# Patient Record
Sex: FEMALE | Race: WHITE | NOT HISPANIC OR LATINO | URBAN - METROPOLITAN AREA
[De-identification: names, ages, dates, MRNs, and addresses within clinical notes are randomized per-mention and may not be internally consistent; named-entity substitution may affect disease eponyms.]

---

## 2018-02-25 ENCOUNTER — INPATIENT (INPATIENT)
Facility: HOSPITAL | Age: 83
LOS: 4 days | Discharge: NURSING, REHAB OR LONG-TERM CARE | DRG: 481 | End: 2018-03-02
Attending: ORTHOPAEDIC SURGERY | Admitting: ORTHOPAEDIC SURGERY
Payer: MEDICARE

## 2018-02-25 VITALS
DIASTOLIC BLOOD PRESSURE: 73 MMHG | HEART RATE: 70 BPM | SYSTOLIC BLOOD PRESSURE: 110 MMHG | OXYGEN SATURATION: 99 % | RESPIRATION RATE: 20 BRPM

## 2018-02-25 DIAGNOSIS — S72.001A FRACTURE OF UNSPECIFIED PART OF NECK OF RIGHT FEMUR, INITIAL ENCOUNTER FOR CLOSED FRACTURE: ICD-10-CM

## 2018-02-25 LAB
ALBUMIN SERPL ELPH-MCNC: 4.4 G/DL — SIGNIFICANT CHANGE UP (ref 3.3–5)
ALP SERPL-CCNC: 86 U/L — SIGNIFICANT CHANGE UP (ref 40–120)
ALT FLD-CCNC: 12 U/L RC — SIGNIFICANT CHANGE UP (ref 10–45)
ANION GAP SERPL CALC-SCNC: 15 MMOL/L — SIGNIFICANT CHANGE UP (ref 5–17)
APPEARANCE UR: CLEAR — SIGNIFICANT CHANGE UP
APTT BLD: 23.1 SEC — LOW (ref 27.5–37.4)
AST SERPL-CCNC: 25 U/L — SIGNIFICANT CHANGE UP (ref 10–40)
BASOPHILS # BLD AUTO: 0.1 K/UL — SIGNIFICANT CHANGE UP (ref 0–0.2)
BASOPHILS NFR BLD AUTO: 0.3 % — SIGNIFICANT CHANGE UP (ref 0–2)
BILIRUB SERPL-MCNC: 0.3 MG/DL — SIGNIFICANT CHANGE UP (ref 0.2–1.2)
BILIRUB UR-MCNC: NEGATIVE — SIGNIFICANT CHANGE UP
BLD GP AB SCN SERPL QL: NEGATIVE — SIGNIFICANT CHANGE UP
BUN SERPL-MCNC: 14 MG/DL — SIGNIFICANT CHANGE UP (ref 7–23)
CALCIUM SERPL-MCNC: 9.6 MG/DL — SIGNIFICANT CHANGE UP (ref 8.4–10.5)
CHLORIDE SERPL-SCNC: 102 MMOL/L — SIGNIFICANT CHANGE UP (ref 96–108)
CO2 SERPL-SCNC: 26 MMOL/L — SIGNIFICANT CHANGE UP (ref 22–31)
COLOR SPEC: YELLOW — SIGNIFICANT CHANGE UP
CREAT SERPL-MCNC: 0.84 MG/DL — SIGNIFICANT CHANGE UP (ref 0.5–1.3)
DIFF PNL FLD: NEGATIVE — SIGNIFICANT CHANGE UP
EOSINOPHIL # BLD AUTO: 0 K/UL — SIGNIFICANT CHANGE UP (ref 0–0.5)
EOSINOPHIL NFR BLD AUTO: 0.1 % — SIGNIFICANT CHANGE UP (ref 0–6)
GLUCOSE SERPL-MCNC: 133 MG/DL — HIGH (ref 70–99)
GLUCOSE UR QL: NEGATIVE — SIGNIFICANT CHANGE UP
HCT VFR BLD CALC: 37.1 % — SIGNIFICANT CHANGE UP (ref 34.5–45)
HGB BLD-MCNC: 12.4 G/DL — SIGNIFICANT CHANGE UP (ref 11.5–15.5)
INR BLD: 1.06 RATIO — SIGNIFICANT CHANGE UP (ref 0.88–1.16)
KETONES UR-MCNC: NEGATIVE — SIGNIFICANT CHANGE UP
LEUKOCYTE ESTERASE UR-ACNC: NEGATIVE — SIGNIFICANT CHANGE UP
LYMPHOCYTES # BLD AUTO: 0.7 K/UL — LOW (ref 1–3.3)
LYMPHOCYTES # BLD AUTO: 4 % — LOW (ref 13–44)
MCHC RBC-ENTMCNC: 32.9 PG — SIGNIFICANT CHANGE UP (ref 27–34)
MCHC RBC-ENTMCNC: 33.5 GM/DL — SIGNIFICANT CHANGE UP (ref 32–36)
MCV RBC AUTO: 98.3 FL — SIGNIFICANT CHANGE UP (ref 80–100)
MONOCYTES # BLD AUTO: 0.9 K/UL — SIGNIFICANT CHANGE UP (ref 0–0.9)
MONOCYTES NFR BLD AUTO: 5.2 % — SIGNIFICANT CHANGE UP (ref 2–14)
NEUTROPHILS # BLD AUTO: 15.9 K/UL — HIGH (ref 1.8–7.4)
NEUTROPHILS NFR BLD AUTO: 90.5 % — HIGH (ref 43–77)
NITRITE UR-MCNC: NEGATIVE — SIGNIFICANT CHANGE UP
PH UR: 6.5 — SIGNIFICANT CHANGE UP (ref 5–8)
PLATELET # BLD AUTO: 258 K/UL — SIGNIFICANT CHANGE UP (ref 150–400)
POTASSIUM SERPL-MCNC: 4 MMOL/L — SIGNIFICANT CHANGE UP (ref 3.5–5.3)
POTASSIUM SERPL-SCNC: 4 MMOL/L — SIGNIFICANT CHANGE UP (ref 3.5–5.3)
PROT SERPL-MCNC: 8 G/DL — SIGNIFICANT CHANGE UP (ref 6–8.3)
PROT UR-MCNC: SIGNIFICANT CHANGE UP
PROTHROM AB SERPL-ACNC: 11.5 SEC — SIGNIFICANT CHANGE UP (ref 9.8–12.7)
RBC # BLD: 3.78 M/UL — LOW (ref 3.8–5.2)
RBC # FLD: 12.2 % — SIGNIFICANT CHANGE UP (ref 10.3–14.5)
RH IG SCN BLD-IMP: POSITIVE — SIGNIFICANT CHANGE UP
SODIUM SERPL-SCNC: 143 MMOL/L — SIGNIFICANT CHANGE UP (ref 135–145)
SP GR SPEC: 1.02 — SIGNIFICANT CHANGE UP (ref 1.01–1.02)
UROBILINOGEN FLD QL: NEGATIVE — SIGNIFICANT CHANGE UP
WBC # BLD: 17.5 K/UL — HIGH (ref 3.8–10.5)
WBC # FLD AUTO: 17.5 K/UL — HIGH (ref 3.8–10.5)

## 2018-02-25 PROCEDURE — 93010 ELECTROCARDIOGRAM REPORT: CPT

## 2018-02-25 PROCEDURE — 73552 X-RAY EXAM OF FEMUR 2/>: CPT | Mod: 26,RT

## 2018-02-25 PROCEDURE — 99285 EMERGENCY DEPT VISIT HI MDM: CPT

## 2018-02-25 PROCEDURE — 71045 X-RAY EXAM CHEST 1 VIEW: CPT | Mod: 26

## 2018-02-25 PROCEDURE — 73502 X-RAY EXAM HIP UNI 2-3 VIEWS: CPT | Mod: 26,77,RT

## 2018-02-25 PROCEDURE — 73502 X-RAY EXAM HIP UNI 2-3 VIEWS: CPT | Mod: 26,RT

## 2018-02-25 RX ORDER — OXYCODONE HYDROCHLORIDE 5 MG/1
5 TABLET ORAL EVERY 4 HOURS
Qty: 0 | Refills: 0 | Status: DISCONTINUED | OUTPATIENT
Start: 2018-02-25 | End: 2018-02-26

## 2018-02-25 RX ORDER — ONDANSETRON 8 MG/1
4 TABLET, FILM COATED ORAL EVERY 4 HOURS
Qty: 0 | Refills: 0 | Status: DISCONTINUED | OUTPATIENT
Start: 2018-02-25 | End: 2018-02-27

## 2018-02-25 RX ORDER — MORPHINE SULFATE 50 MG/1
2 CAPSULE, EXTENDED RELEASE ORAL ONCE
Qty: 0 | Refills: 0 | Status: DISCONTINUED | OUTPATIENT
Start: 2018-02-25 | End: 2018-02-25

## 2018-02-25 RX ORDER — HEPARIN SODIUM 5000 [USP'U]/ML
5000 INJECTION INTRAVENOUS; SUBCUTANEOUS EVERY 8 HOURS
Qty: 0 | Refills: 0 | Status: COMPLETED | OUTPATIENT
Start: 2018-02-25 | End: 2018-02-25

## 2018-02-25 RX ORDER — FERROUS SULFATE 325(65) MG
325 TABLET ORAL
Qty: 0 | Refills: 0 | Status: DISCONTINUED | OUTPATIENT
Start: 2018-02-25 | End: 2018-02-26

## 2018-02-25 RX ORDER — FOLIC ACID 0.8 MG
1 TABLET ORAL DAILY
Qty: 0 | Refills: 0 | Status: DISCONTINUED | OUTPATIENT
Start: 2018-02-25 | End: 2018-02-27

## 2018-02-25 RX ORDER — MAGNESIUM HYDROXIDE 400 MG/1
30 TABLET, CHEWABLE ORAL DAILY
Qty: 0 | Refills: 0 | Status: DISCONTINUED | OUTPATIENT
Start: 2018-02-25 | End: 2018-02-27

## 2018-02-25 RX ORDER — ACETAMINOPHEN 500 MG
650 TABLET ORAL EVERY 6 HOURS
Qty: 0 | Refills: 0 | Status: DISCONTINUED | OUTPATIENT
Start: 2018-02-25 | End: 2018-02-27

## 2018-02-25 RX ORDER — SODIUM CHLORIDE 9 MG/ML
1000 INJECTION, SOLUTION INTRAVENOUS
Qty: 0 | Refills: 0 | Status: DISCONTINUED | OUTPATIENT
Start: 2018-02-25 | End: 2018-02-27

## 2018-02-25 RX ORDER — SIMVASTATIN 20 MG/1
1 TABLET, FILM COATED ORAL
Qty: 0 | Refills: 0 | COMMUNITY

## 2018-02-25 RX ORDER — HYDROMORPHONE HYDROCHLORIDE 2 MG/ML
0.25 INJECTION INTRAMUSCULAR; INTRAVENOUS; SUBCUTANEOUS EVERY 6 HOURS
Qty: 0 | Refills: 0 | Status: DISCONTINUED | OUTPATIENT
Start: 2018-02-25 | End: 2018-02-26

## 2018-02-25 RX ORDER — OXYCODONE HYDROCHLORIDE 5 MG/1
10 TABLET ORAL EVERY 4 HOURS
Qty: 0 | Refills: 0 | Status: DISCONTINUED | OUTPATIENT
Start: 2018-02-25 | End: 2018-02-26

## 2018-02-25 RX ORDER — DOCUSATE SODIUM 100 MG
100 CAPSULE ORAL THREE TIMES A DAY
Qty: 0 | Refills: 0 | Status: DISCONTINUED | OUTPATIENT
Start: 2018-02-25 | End: 2018-02-27

## 2018-02-25 RX ORDER — DIPHENHYDRAMINE HCL 50 MG
25 CAPSULE ORAL AT BEDTIME
Qty: 0 | Refills: 0 | Status: DISCONTINUED | OUTPATIENT
Start: 2018-02-25 | End: 2018-02-26

## 2018-02-25 RX ORDER — ASCORBIC ACID 60 MG
500 TABLET,CHEWABLE ORAL
Qty: 0 | Refills: 0 | Status: DISCONTINUED | OUTPATIENT
Start: 2018-02-25 | End: 2018-02-27

## 2018-02-25 RX ADMIN — HEPARIN SODIUM 5000 UNIT(S): 5000 INJECTION INTRAVENOUS; SUBCUTANEOUS at 23:05

## 2018-02-25 RX ADMIN — MORPHINE SULFATE 2 MILLIGRAM(S): 50 CAPSULE, EXTENDED RELEASE ORAL at 14:49

## 2018-02-25 RX ADMIN — MORPHINE SULFATE 2 MILLIGRAM(S): 50 CAPSULE, EXTENDED RELEASE ORAL at 12:39

## 2018-02-25 RX ADMIN — MORPHINE SULFATE 2 MILLIGRAM(S): 50 CAPSULE, EXTENDED RELEASE ORAL at 14:30

## 2018-02-25 RX ADMIN — Medication 650 MILLIGRAM(S): at 23:58

## 2018-02-25 NOTE — H&P ADULT - NSHPLABSRESULTS_GEN_ALL_CORE
xray pelvis/hip: R IT fracture      INTERPRETATION: CLINICAL INFORMATION: Fall one day prior, right hip pain.     EXAM: Frontal and crosstable lateral views of the right hip and femur from   2/25/2018 at 1324 hours     COMPARISON: No similar prior studies available for comparison.     FINDINGS:   Acute comminuted intertrochanteric fracture of the right femur with medial   angulation and mild proximal retraction of the distal fracture fragment and   overlying soft tissue swelling.   The femoral head maintains normal alignment with the acetabulum. The bones   are osteopenic. There are degenerative changes of the knee.       IMPRESSION:   Acute comminuted intertrochanteric fracture of the right femur with medial   angulation and mild proximal retraction of the distal fracture fragment and   overlying soft tissue swelling

## 2018-02-25 NOTE — CONSULT NOTE ADULT - SUBJECTIVE AND OBJECTIVE BOX
CHIEF COMPLAINT: s/p fall, right hip fracture    HPI:  86y Female ambulator presents c/o R hip pain and inability to ambulate s/p mechanical fall. Pt was found down this AM at home by . Patients baseline has dementia and is AOx2.  Denies any loss of consciousness. Denies numbness/tingling. Denies fever/chills. Denies pain/injury elsewhere.                    PAST MEDICAL & SURGICAL HISTORY:  Dementia  Dyslipidemia  S/P breast biopsy: Right breast cyst benign.    MEDICATIONS  (STANDING):  ascorbic acid 500 milliGRAM(s) Oral two times a day  docusate sodium 100 milliGRAM(s) Oral three times a day  ferrous    sulfate 325 milliGRAM(s) Oral three times a day with meals  folic acid 1 milliGRAM(s) Oral daily  heparin  Injectable 5000 Unit(s) SubCutaneous every 8 hours  lactated ringers. 1000 milliLiter(s) (75 mL/Hr) IV Continuous <Continuous>  multivitamin 1 Tablet(s) Oral daily    MEDICATIONS  (PRN):  acetaminophen   Tablet 650 milliGRAM(s) Oral every 6 hours PRN For Temp greater than 38 C (100.4 F)  acetaminophen   Tablet. 650 milliGRAM(s) Oral every 6 hours PRN headache  diphenhydrAMINE   Capsule 25 milliGRAM(s) Oral at bedtime PRN Insomnia  HYDROmorphone  Injectable 0.25 milliGRAM(s) IV Push every 6 hours PRN Severe Pain (7 - 10)  magnesium hydroxide Suspension 30 milliLiter(s) Oral daily PRN Constipation  ondansetron Injectable 4 milliGRAM(s) IV Push every 4 hours PRN Nausea and/or Vomiting  oxyCODONE    IR 10 milliGRAM(s) Oral every 4 hours PRN Moderate Pain  oxyCODONE    IR 5 milliGRAM(s) Oral every 4 hours PRN Mild Pain      Allergies    No Known Allergies    SOCIAL HISTORY    Smoking Hx:  ETOH Hx:  Marital Status:   Occupational Hx:    FAMILY HISTORY:  No pertinent family history in first degree relatives        REVIEW OF SYSTEMS:    CONSTITUTIONAL: No weakness, fevers or chills  EYES/ENT: No visual changes;  No vertigo or throat pain   NECK: No pain or stiffness  RESPIRATORY: No cough, wheezing, hemoptysis; No shortness of breath  CARDIOVASCULAR: No chest pain or palpitations  GASTROINTESTINAL: No abdominal or epigastric pain. No nausea, vomiting, or hematemesis; No diarrhea or constipation. No melena or hematochezia.  GENITOURINARY: No dysuria, frequency or hematuria  NEUROLOGICAL: No numbness or weakness  SKIN: No itching, burning, rashes, or lesions   All other review of systems is negative unless indicated above    Vital Signs Last 24 Hrs  T(C): 36.8 (25 Feb 2018 17:19), Max: 37 (25 Feb 2018 16:56)  T(F): 98.2 (25 Feb 2018 17:19), Max: 98.6 (25 Feb 2018 16:56)  HR: 75 (25 Feb 2018 17:19) (64 - 75)  BP: 149/72 (25 Feb 2018 17:19) (110/73 - 158/83)  BP(mean): --  RR: 16 (25 Feb 2018 17:19) (16 - 20)  SpO2: 94% (25 Feb 2018 17:19) (94% - 99%)    I&O's Summary      PHYSICAL EXAM:    Constitutional: NAD, awake and alert, well-developed  HEENT: PERR, EOMI  Neck: soft and supple, No LAD, No JVD  Respiratory: Breath sounds are clear bilaterally, No wheezing, rales or rhonchi  Cardiovascular: Regular rate and rhythm, normal S1 and S2,  no murmurs, gallops or rubs  Gastrointestinal: Bowel Sounds present, soft, nontender.   Extremities: No peripheral edema. No clubbing or cyanosis.  Vascular: 2+ peripheral pulses  Neurological: A/O x 3, no focal deficits  Musculoskeletal: no calf tenderness.  Skin: No rashes.      LABS: All Labs Reviewed:                        12.4   17.5  )-----------( 258      ( 25 Feb 2018 12:24 )             37.1     25 Feb 2018 12:24    143    |  102    |  14     ----------------------------<  133    4.0     |  26     |  0.84     Ca    9.6        25 Feb 2018 12:24    TPro  8.0    /  Alb  4.4    /  TBili  0.3    /  DBili  x      /  AST  25     /  ALT  12     /  AlkPhos  86     25 Feb 2018 12:24    PT/INR - ( 25 Feb 2018 12:24 )   PT: 11.5 sec;   INR: 1.06 ratio         PTT - ( 25 Feb 2018 12:24 )  PTT:23.1 sec    RADIOLOGY/EKG:    < from: Xray Hip 2-3 Views, Right (02.25.18 @ 13:24) >  IMPRESSION:  Acute comminuted intertrochanteric fracture of the right femur with   medial angulation and mild proximal retraction of the distal fracture   fragment and overlying soft tissue swelling.      < end of copied text > CHIEF COMPLAINT: s/p fall, right hip fracture                              Patient with dementia so obtained history from chart and daughter at bedside    HPI:  86y Female ambulator presents c/o R hip pain and inability to ambulate s/p mechanical fall. Pt was found down this AM at home by . Patients baseline has dementia and is AOx2.  Denies any loss of consciousness. Denies numbness/tingling. Denies fever/chills. Denies pain/injury elsewhere. No significant PMH                   PAST MEDICAL & SURGICAL HISTORY:  Dementia  Dyslipidemia  S/P breast biopsy: Right breast cyst benign.    MEDICATIONS  (STANDING):  ascorbic acid 500 milliGRAM(s) Oral two times a day  docusate sodium 100 milliGRAM(s) Oral three times a day  ferrous    sulfate 325 milliGRAM(s) Oral three times a day with meals  folic acid 1 milliGRAM(s) Oral daily  heparin  Injectable 5000 Unit(s) SubCutaneous every 8 hours  lactated ringers. 1000 milliLiter(s) (75 mL/Hr) IV Continuous <Continuous>  multivitamin 1 Tablet(s) Oral daily    MEDICATIONS  (PRN):  acetaminophen   Tablet 650 milliGRAM(s) Oral every 6 hours PRN For Temp greater than 38 C (100.4 F)  acetaminophen   Tablet. 650 milliGRAM(s) Oral every 6 hours PRN headache  diphenhydrAMINE   Capsule 25 milliGRAM(s) Oral at bedtime PRN Insomnia  HYDROmorphone  Injectable 0.25 milliGRAM(s) IV Push every 6 hours PRN Severe Pain (7 - 10)  magnesium hydroxide Suspension 30 milliLiter(s) Oral daily PRN Constipation  ondansetron Injectable 4 milliGRAM(s) IV Push every 4 hours PRN Nausea and/or Vomiting  oxyCODONE    IR 10 milliGRAM(s) Oral every 4 hours PRN Moderate Pain  oxyCODONE    IR 5 milliGRAM(s) Oral every 4 hours PRN Mild Pain      Allergies    No Known Allergies    SOCIAL HISTORY    Smoking Hx: None  ETOH Hx: None  Marital Status:   Occupational Hx: [employer]    FAMILY HISTORY:  No pertinent family history in first degree relatives        REVIEW OF SYSTEMS:    CONSTITUTIONAL: No weakness, fevers or chills  EYES/ENT: No visual changes;  No vertigo or throat pain   NECK: No pain or stiffness  RESPIRATORY: No cough, wheezing, hemoptysis; No shortness of breath  CARDIOVASCULAR: No chest pain or palpitations  GASTROINTESTINAL: No abdominal or epigastric pain. No nausea, vomiting, or hematemesis; No diarrhea or constipation. No melena or hematochezia.  GENITOURINARY: No dysuria, frequency or hematuria  NEUROLOGICAL: No numbness or weakness  SKIN: No itching, burning, rashes, or lesions   All other review of systems is negative unless indicated above    Vital Signs Last 24 Hrs  T(C): 36.8 (25 Feb 2018 17:19), Max: 37 (25 Feb 2018 16:56)  T(F): 98.2 (25 Feb 2018 17:19), Max: 98.6 (25 Feb 2018 16:56)  HR: 75 (25 Feb 2018 17:19) (64 - 75)  BP: 149/72 (25 Feb 2018 17:19) (110/73 - 158/83)  BP(mean): --  RR: 16 (25 Feb 2018 17:19) (16 - 20)  SpO2: 94% (25 Feb 2018 17:19) (94% - 99%)    I&O's Summary      PHYSICAL EXAM:    Constitutional: NAD, awake and alert, well-developed  HEENT: PERR, EOMI  Neck: soft and supple, No LAD, No JVD  Respiratory: Breath sounds are clear bilaterally, No wheezing, rales or rhonchi  Cardiovascular: Regular rate and rhythm, normal S1 and S2,  no murmurs, gallops or rubs  Gastrointestinal: Bowel Sounds present, soft, nontender.   Extremities: No peripheral edema. No clubbing or cyanosis. Right hip pain on any movement  Vascular: 2+ peripheral pulses  Neurological: A/O x 2, no focal deficits  Musculoskeletal: no calf tenderness.  Skin: No rashes.      LABS: All Labs Reviewed:                        12.4   17.5  )-----------( 258      ( 25 Feb 2018 12:24 )             37.1     25 Feb 2018 12:24    143    |  102    |  14     ----------------------------<  133    4.0     |  26     |  0.84     Ca    9.6        25 Feb 2018 12:24    TPro  8.0    /  Alb  4.4    /  TBili  0.3    /  DBili  x      /  AST  25     /  ALT  12     /  AlkPhos  86     25 Feb 2018 12:24    PT/INR - ( 25 Feb 2018 12:24 )   PT: 11.5 sec;   INR: 1.06 ratio         PTT - ( 25 Feb 2018 12:24 )  PTT:23.1 sec    RADIOLOGY/EKG: NSR @ 70, normal intervals, normal axis    < from: Xray Hip 2-3 Views, Right (02.25.18 @ 13:24) >  IMPRESSION:  Acute comminuted intertrochanteric fracture of the right femur with   medial angulation and mild proximal retraction of the distal fracture   fragment and overlying soft tissue swelling.      < end of copied text >

## 2018-02-25 NOTE — ED PROVIDER NOTE - MEDICAL DECISION MAKING DETAILS
87 yo F presenting after fall with evidence of R hip fracture. Will get basic labs, hip imaging and pain control. Likely will need ortho consult after imaging returns. Will reassess.

## 2018-02-25 NOTE — ED PROVIDER NOTE - OBJECTIVE STATEMENT
87 yo F PMH of Dementia (AOx2) presenting after an unwitnessed fall. P 85 yo F PMH of Dementia (AOx2) presenting after an unwitnessed fall. Per family pt was found at bedside on the floor around 9AM this morning.  tried to get her up but she was complaining of pain in the R hip so he called his son. Son came over and checked her BP (110/65) and tried to get her up but was also unable to 2/2 pain.  notes no recent medical complaints. Has noticed that she has increased unsteadiness on her feet for the past few weeks. Pt denies any sob, cough, fevers, chills, chest pain, abd pain. Pt states she only has pain in her R hip, no head or neck pain.

## 2018-02-25 NOTE — H&P ADULT - ASSESSMENT
A/P: 86y Female with R hip fracture    Pain control  NWB R LE, bedrest  FU labs  dvt ppx  imaging reviewed  Admit to medical team  Medical clearance/optimization for OR  npo except meds after midnight  iv fluids while npo  hold chemical AC after midnight

## 2018-02-25 NOTE — ED ADULT NURSE NOTE - OBJECTIVE STATEMENT
86y f pt arrived via ems; emt reports unwitnessed fall from bed; unknown loc;  in next room found pt next to bed; emt reports pt not on floor long; pt states slipped but doesn't remember how; pt reports hit head but doesn't think passed out; family states pt at baseline is aox1-2; no resp distress; no sob; no chest pain; pt c/o of pain only in right hip; right leg shortened and externally rotated; + periph pulses distal to injury; left leg unaffected; no bruising or swelling noted only deformity; cap refil <2; iv placed; labs drawn; pt medicated per md order; goyal catheter placed per md order using sterile technique; pt tolerated procedure well; 50ml of clear yellow urine returned

## 2018-02-25 NOTE — CONSULT NOTE ADULT - ASSESSMENT
87 yo F presenting after fall with evidence of R hip fracture.  Patients only medical history is dementia and hypercholesterolemia. 85 yo F presenting after fall with evidence of R hip fracture.  Patients only medical history is dementia and hypercholesterolemia.  Patient has had no symptoms attributable to ACS.  ECG is normal.  Patient is in optimal condition for surgery.  There are no cardiac contraindications.  Will follow with you post op.

## 2018-02-25 NOTE — ED PROVIDER NOTE - ATTENDING CONTRIBUTION TO CARE
87 y/o female s/p what appears to have been a simple mechanical fall with a cc of R hip pain. Her pain has been sharp, constant, exacerbated and severe with movement, relieved by immobilization and associated with a decreased ROM and inability to ambulate. She denies having experienced any HA, visual disturbance, neck pain, chest pain, palpitation, shortness of breath, abdominal pain, nausea, vomiting or focal weakness either prior to or after the event. On exam, she appears well and comfortable. VSs noted, head AT, neck supple c/ FROM s/ pain, paresthesia, midline c-spine ttp, lungs CTA, chest wall s/ ttp, cardiac sounds s/ audible m/r/g, abdomen soft, NT/ND, extremities c/ RLE shortened and externally rotated but neurovascularly intact, skin s/ rash and neurologically intact. Screening labs and imaging obtained. Fracture identified. Analgesia provided. Private Orthopedic service at daughter's request called. Awaiting reply. To be admitted.

## 2018-02-25 NOTE — ED PROVIDER NOTE - PROGRESS NOTE DETAILS
Family wishes to use private ortho group Dr. Miguel Goel [phone] Daughter-in-law Karen Goldberg h [phone] or c [phone] spoke with Dr. Goel's group, they recommended calling [facility]. Will admit to [facility]

## 2018-02-25 NOTE — H&P ADULT - NSHPPHYSICALEXAM_GEN_ALL_CORE
Physical Exam  Gen: NAD  R LE: skin intact, short externally rotated, unable to SLR R LE, + log roll R LE, +ttp hip/groin, no ttp elsewhere, +ehl/fhl/ta/gs function, silt grossly, no calf ttp, dp/pt pulse intact, compartments soft    Secondary survey: benign, nv intact, able to SLR contralateral leg, negative log roll contralateral leg, no bony ttp elsewhere

## 2018-02-25 NOTE — H&P ADULT - HISTORY OF PRESENT ILLNESS
86y Female ambulator w/ AD presents c/o R hip pain and inability to ambulate sp mechanical fall. pt was found down the AM at home by . pt baseline has dementia and is AOx2. unknown if HS or LOC. Denies numbness/tingling. Denies fever/chills. Denies pain/injury elsewhere.                           12.4   17.5  )-----------( 258      ( 25 Feb 2018 12:24 )             37.1     25 Feb 2018 12:24    143    |  102    |  14     ----------------------------<  133    4.0     |  26     |  0.84     Ca    9.6        25 Feb 2018 12:24    TPro  8.0    /  Alb  4.4    /  TBili  0.3    /  DBili  x      /  AST  25     /  ALT  12     /  AlkPhos  86     25 Feb 2018 12:24    PT/INR - ( 25 Feb 2018 12:24 )   PT: 11.5 sec;   INR: 1.06 ratio         PTT - ( 25 Feb 2018 12:24 )  PTT:23.1 sec  Vital Signs Last 24 Hrs  T(C): 36.9 (02-25-18 @ 11:54), Max: 36.9 (02-25-18 @ 11:54)  T(F): 98.5 (02-25-18 @ 11:54), Max: 98.5 (02-25-18 @ 11:54)  HR: 64 (02-25-18 @ 12:20) (64 - 70)  BP: 136/79 (02-25-18 @ 12:20) (110/73 - 136/79)  BP(mean): --  RR: 20 (02-25-18 @ 12:20) (20 - 20)  SpO2: 99% (02-25-18 @ 12:20) (99% - 99%)

## 2018-02-26 DIAGNOSIS — Z71.89 OTHER SPECIFIED COUNSELING: ICD-10-CM

## 2018-02-26 DIAGNOSIS — R26.81 UNSTEADINESS ON FEET: ICD-10-CM

## 2018-02-26 DIAGNOSIS — M80.00XA AGE-RELATED OSTEOPOROSIS WITH CURRENT PATHOLOGICAL FRACTURE, UNSPECIFIED SITE, INITIAL ENCOUNTER FOR FRACTURE: ICD-10-CM

## 2018-02-26 DIAGNOSIS — S72.001A FRACTURE OF UNSPECIFIED PART OF NECK OF RIGHT FEMUR, INITIAL ENCOUNTER FOR CLOSED FRACTURE: ICD-10-CM

## 2018-02-26 DIAGNOSIS — F03.90 UNSPECIFIED DEMENTIA WITHOUT BEHAVIORAL DISTURBANCE: ICD-10-CM

## 2018-02-26 LAB
ANION GAP SERPL CALC-SCNC: 11 MMOL/L — SIGNIFICANT CHANGE UP (ref 5–17)
APTT BLD: 25.3 SEC — LOW (ref 27.5–37.4)
BUN SERPL-MCNC: 14 MG/DL — SIGNIFICANT CHANGE UP (ref 7–23)
CALCIUM SERPL-MCNC: 9 MG/DL — SIGNIFICANT CHANGE UP (ref 8.4–10.5)
CHLORIDE SERPL-SCNC: 102 MMOL/L — SIGNIFICANT CHANGE UP (ref 96–108)
CO2 SERPL-SCNC: 27 MMOL/L — SIGNIFICANT CHANGE UP (ref 22–31)
CREAT SERPL-MCNC: 0.75 MG/DL — SIGNIFICANT CHANGE UP (ref 0.5–1.3)
CULTURE RESULTS: NO GROWTH — SIGNIFICANT CHANGE UP
GLUCOSE SERPL-MCNC: 117 MG/DL — HIGH (ref 70–99)
HCT VFR BLD CALC: 32.6 % — LOW (ref 34.5–45)
HGB BLD-MCNC: 11 G/DL — LOW (ref 11.5–15.5)
INR BLD: 1.05 RATIO — SIGNIFICANT CHANGE UP (ref 0.88–1.16)
MCHC RBC-ENTMCNC: 32.9 PG — SIGNIFICANT CHANGE UP (ref 27–34)
MCHC RBC-ENTMCNC: 33.9 GM/DL — SIGNIFICANT CHANGE UP (ref 32–36)
MCV RBC AUTO: 97.3 FL — SIGNIFICANT CHANGE UP (ref 80–100)
PLATELET # BLD AUTO: 244 K/UL — SIGNIFICANT CHANGE UP (ref 150–400)
POTASSIUM SERPL-MCNC: 3.7 MMOL/L — SIGNIFICANT CHANGE UP (ref 3.5–5.3)
POTASSIUM SERPL-SCNC: 3.7 MMOL/L — SIGNIFICANT CHANGE UP (ref 3.5–5.3)
PROTHROM AB SERPL-ACNC: 11.4 SEC — SIGNIFICANT CHANGE UP (ref 9.8–12.7)
RBC # BLD: 3.35 M/UL — LOW (ref 3.8–5.2)
RBC # FLD: 12.2 % — SIGNIFICANT CHANGE UP (ref 10.3–14.5)
SODIUM SERPL-SCNC: 140 MMOL/L — SIGNIFICANT CHANGE UP (ref 135–145)
SPECIMEN SOURCE: SIGNIFICANT CHANGE UP
WBC # BLD: 13.5 K/UL — HIGH (ref 3.8–10.5)
WBC # FLD AUTO: 13.5 K/UL — HIGH (ref 3.8–10.5)

## 2018-02-26 PROCEDURE — 99223 1ST HOSP IP/OBS HIGH 75: CPT

## 2018-02-26 RX ORDER — ACETAMINOPHEN 500 MG
975 TABLET ORAL EVERY 8 HOURS
Qty: 0 | Refills: 0 | Status: DISCONTINUED | OUTPATIENT
Start: 2018-02-26 | End: 2018-02-27

## 2018-02-26 RX ORDER — SIMVASTATIN 20 MG/1
20 TABLET, FILM COATED ORAL AT BEDTIME
Qty: 0 | Refills: 0 | Status: DISCONTINUED | OUTPATIENT
Start: 2018-02-26 | End: 2018-02-27

## 2018-02-26 RX ORDER — OXYCODONE HYDROCHLORIDE 5 MG/1
5 TABLET ORAL EVERY 6 HOURS
Qty: 0 | Refills: 0 | Status: DISCONTINUED | OUTPATIENT
Start: 2018-02-26 | End: 2018-02-27

## 2018-02-26 RX ORDER — HEPARIN SODIUM 5000 [USP'U]/ML
5000 INJECTION INTRAVENOUS; SUBCUTANEOUS EVERY 8 HOURS
Qty: 0 | Refills: 0 | Status: DISCONTINUED | OUTPATIENT
Start: 2018-02-26 | End: 2018-02-26

## 2018-02-26 RX ORDER — HYDROMORPHONE HYDROCHLORIDE 2 MG/ML
0.25 INJECTION INTRAMUSCULAR; INTRAVENOUS; SUBCUTANEOUS EVERY 6 HOURS
Qty: 0 | Refills: 0 | Status: DISCONTINUED | OUTPATIENT
Start: 2018-02-26 | End: 2018-02-27

## 2018-02-26 RX ORDER — HEPARIN SODIUM 5000 [USP'U]/ML
5000 INJECTION INTRAVENOUS; SUBCUTANEOUS EVERY 8 HOURS
Qty: 0 | Refills: 0 | Status: COMPLETED | OUTPATIENT
Start: 2018-02-26 | End: 2018-02-26

## 2018-02-26 RX ORDER — DONEPEZIL HYDROCHLORIDE 10 MG/1
10 TABLET, FILM COATED ORAL AT BEDTIME
Qty: 0 | Refills: 0 | Status: DISCONTINUED | OUTPATIENT
Start: 2018-02-26 | End: 2018-02-27

## 2018-02-26 RX ORDER — ACETAMINOPHEN 500 MG
750 TABLET ORAL ONCE
Qty: 0 | Refills: 0 | Status: COMPLETED | OUTPATIENT
Start: 2018-02-26 | End: 2018-02-26

## 2018-02-26 RX ORDER — TRAMADOL HYDROCHLORIDE 50 MG/1
25 TABLET ORAL EVERY 4 HOURS
Qty: 0 | Refills: 0 | Status: DISCONTINUED | OUTPATIENT
Start: 2018-02-26 | End: 2018-02-27

## 2018-02-26 RX ADMIN — TRAMADOL HYDROCHLORIDE 25 MILLIGRAM(S): 50 TABLET ORAL at 20:44

## 2018-02-26 RX ADMIN — TRAMADOL HYDROCHLORIDE 25 MILLIGRAM(S): 50 TABLET ORAL at 15:11

## 2018-02-26 RX ADMIN — Medication 100 MILLIGRAM(S): at 13:56

## 2018-02-26 RX ADMIN — Medication 300 MILLIGRAM(S): at 11:46

## 2018-02-26 RX ADMIN — Medication 750 MILLIGRAM(S): at 12:00

## 2018-02-26 RX ADMIN — HEPARIN SODIUM 5000 UNIT(S): 5000 INJECTION INTRAVENOUS; SUBCUTANEOUS at 22:11

## 2018-02-26 RX ADMIN — Medication 500 MILLIGRAM(S): at 17:29

## 2018-02-26 RX ADMIN — Medication 650 MILLIGRAM(S): at 17:29

## 2018-02-26 RX ADMIN — TRAMADOL HYDROCHLORIDE 25 MILLIGRAM(S): 50 TABLET ORAL at 21:14

## 2018-02-26 RX ADMIN — Medication 1 MILLIGRAM(S): at 11:46

## 2018-02-26 RX ADMIN — DONEPEZIL HYDROCHLORIDE 10 MILLIGRAM(S): 10 TABLET, FILM COATED ORAL at 22:11

## 2018-02-26 RX ADMIN — HEPARIN SODIUM 5000 UNIT(S): 5000 INJECTION INTRAVENOUS; SUBCUTANEOUS at 14:00

## 2018-02-26 RX ADMIN — Medication 1 TABLET(S): at 11:46

## 2018-02-26 RX ADMIN — Medication 100 MILLIGRAM(S): at 22:11

## 2018-02-26 RX ADMIN — Medication 650 MILLIGRAM(S): at 18:00

## 2018-02-26 RX ADMIN — SIMVASTATIN 20 MILLIGRAM(S): 20 TABLET, FILM COATED ORAL at 22:11

## 2018-02-26 RX ADMIN — TRAMADOL HYDROCHLORIDE 25 MILLIGRAM(S): 50 TABLET ORAL at 15:45

## 2018-02-26 NOTE — PROGRESS NOTE ADULT - SUBJECTIVE AND OBJECTIVE BOX
No events overnight.  Pain controlled.    Vital Signs Last 24 Hrs  T(C): 36.6 (26 Feb 2018 04:34), Max: 37.1 (25 Feb 2018 21:37)  T(F): 97.9 (26 Feb 2018 04:34), Max: 98.8 (25 Feb 2018 21:37)  HR: 70 (26 Feb 2018 04:34) (64 - 75)  BP: 145/66 (26 Feb 2018 04:34) (110/73 - 158/83)  BP(mean): --  RR: 18 (26 Feb 2018 04:34) (16 - 20)  SpO2: 96% (26 Feb 2018 04:34) (94% - 99%)    NAD, pleasant and cooperative  RLE: + TA EHL GS  SILT  +2 DP  skin intact without laceration or abrasion  comp soft    AP: 86F with R IT fx  - pain control  - OR plan likely tomorrow but remain NPO until OR today ruled out  - NWB RLE  - Med risk stratification performed

## 2018-02-26 NOTE — PROGRESS NOTE ADULT - SUBJECTIVE AND OBJECTIVE BOX
Orthopaedic Surgery Preop Note    Dx: R IT femur fx  Procedure: IMN  Surgeon: Little        140  |  102  |  14  ----------------------------<  117<H>  3.7   |  27  |  0.75    Ca    9.0      2018 04:37    TPro  8.0  /  Alb  4.4  /  TBili  0.3  /  DBili  x   /  AST  25  /  ALT  12  /  AlkPhos  86                            11.0   13.5  )-----------( 244      ( 2018 04:37 )             32.6     PT/INR - ( 2018 04:37 )   PT: 11.4 sec;   INR: 1.05 ratio         PTT - ( 2018 04:37 )  PTT:25.3 sec  Urinalysis Basic - ( 2018 13:08 )    Color: Yellow / Appearance: Clear / S.017 / pH: x  Gluc: x / Ketone: Negative  / Bili: Negative / Urobili: Negative   Blood: x / Protein: Trace / Nitrite: Negative   Leuk Esterase: Negative / RBC: x / WBC x   Sq Epi: x / Non Sq Epi: x / Bacteria: x        - EKG in chart  - T/S x 2 done  - UA negative  - CXR done    86y Female to undergo R femur IMN  - NPO pMN  - IVF when NPO  - Hold anticoagulation pMN  - Consent in chart  - Plan for OR tomorrow

## 2018-02-26 NOTE — CONSULT NOTE ADULT - SUBJECTIVE AND OBJECTIVE BOX
Patient is a 86y old  Female who presents with a chief complaint of left hip pain (2018 17:29)    ADMISSION HPI:  86y Female ambulator w/ AD presents c/o R hip pain and inability to ambulate sp mechanical fall. pt was found down the AM at home by . pt baseline has dementia and is AOx2. unknown if HS or LOC. Denies numbness/tingling. Denies fever/chills. Denies pain/injury elsewhere.                           12.4   17.5  )-----------( 258      ( 2018 12:24 )             37.1     2018 12:24    143    |  102    |  14     ----------------------------<  133    4.0     |  26     |  0.84     Ca    9.6        2018 12:24    TPro  8.0    /  Alb  4.4    /  TBili  0.3    /  DBili  x      /  AST  25     /  ALT  12     /  AlkPhos  86     2018 12:24    PT/INR - ( 2018 12:24 )   PT: 11.5 sec;   INR: 1.06 ratio         PTT - ( 2018 12:24 )  PTT:23.1 sec  Vital Signs Last 24 Hrs  T(C): 36.9 (18 @ 11:54), Max: 36.9 (18 @ 11:54)  T(F): 98.5 (18 @ 11:54), Max: 98.5 (18 @ 11:54)  HR: 64 (18 @ 12:20) (64 - 70)  BP: 136/79 (18 @ 12:20) (110/73 - 136/79)  BP(mean): --  RR: 20 (18 @ 12:20) (20 - 20)  SpO2: 99% (18 @ 12:20) (99% - 99%) (2018 15:34)      HOSPITAL COURSE:    PAST MEDICAL & SURGICAL HISTORY:  Dementia  Dementia  Dyslipidemia  S/P breast biopsy: Right breast cyst benign.    FAMILY HISTORY:  No pertinent family history in first degree relatives      Social History:     Functional status (PTA):  [ ]  Independent   [ ] Partially dependent    [ ] Dependent   Ambulation status:          [ ] independant  [ ] cane   [ ] Walker   [ ] Wheelchair    [ ] bedbound    PAIN:      [ ] No [ ] Yes:   (Intensity / Location / Radiation / Character / Onset / Duration / Timing / Alleviating/Exacerbating factors)    REVIEW OF SYSTEMS:   Source: [ ] Patient   [ ] Family  [ ] Team    GENERAL/CONSTITUTIONAL:  No fever, chills, pain, weight loss, fatigue         EYES:  No eye pain, visual disturbances, discharge   ENMT: No hearing difficulties, tinnitus, vertigo, sore throat, sinus pain  NECK:  No pain or stiffness   BREASTS: No pain, masses, or nipple discharge  RESPIRATORY: No SOB, cough, wheezing, chills or hemoptysis  CARDIOVASCULAR: No chest pain, palpitations, dizziness, or leg swelling  GASTROINTESTINAL: No abdominal pain, loss of appetite, N/V, diarrhea, constipation, melena, hematochezia    [ ] Stool incontinence   GENITOURINARY: No dysuria, urinary frequency, hematuria       [ ] Urinary incontinence   NEUROLOGICAL: No headaches, memory loss, weakness, numbness, or tremors      [ ] Cognitive impairment   SKIN: No itching, burning, rashes, or lesions   LYMPH NODES: No enlarged glands  ENDOCRINE: No heat or cold intolerance; No hair loss  MUSCULOSKELETAL: No muscle, back, or extremity pain   [ ] Extremity swelling   PSYCHIATRIC: No depression, anxiety, mood swings, or difficulty sleeping  HEME/LYMPH: No easy bruising, or bleeding gums  ALLERGY AND IMMUNOLOGIC: No hives or eczema    [ ] All other review of systems reviewed and negative unless noted above   [ ] Unable to obtain due to poor mentation           No Known Allergies    MEDICATIONS  (STANDING):  ascorbic acid 500 milliGRAM(s) Oral two times a day  docusate sodium 100 milliGRAM(s) Oral three times a day  donepezil 10 milliGRAM(s) Oral at bedtime  folic acid 1 milliGRAM(s) Oral daily  heparin  Injectable 5000 Unit(s) SubCutaneous every 8 hours  lactated ringers. 1000 milliLiter(s) (75 mL/Hr) IV Continuous <Continuous>  multivitamin 1 Tablet(s) Oral daily  simvastatin 20 milliGRAM(s) Oral at bedtime    MEDICATIONS  (PRN):  acetaminophen   Tablet 650 milliGRAM(s) Oral every 6 hours PRN For Temp greater than 38 C (100.4 F)  acetaminophen   Tablet. 650 milliGRAM(s) Oral every 6 hours PRN headache  acetaminophen   Tablet. 975 milliGRAM(s) Oral every 8 hours PRN Mild Pain (1 - 3)  HYDROmorphone  Injectable 0.25 milliGRAM(s) IV Push every 6 hours PRN breakthrough  magnesium hydroxide Suspension 30 milliLiter(s) Oral daily PRN Constipation  ondansetron Injectable 4 milliGRAM(s) IV Push every 4 hours PRN Nausea and/or Vomiting  oxyCODONE    IR 5 milliGRAM(s) Oral every 6 hours PRN severe pain ONLY  traMADol 25 milliGRAM(s) Oral every 4 hours PRN Moderate Pain (4 - 6)      Vital Signs Last 24 Hrs  T(C): 36.6 (2018 04:34), Max: 37.1 (2018 21:37)  T(F): 97.9 (2018 04:34), Max: 98.8 (2018 21:37)  HR: 70 (2018 04:34) (67 - 75)  BP: 145/66 (2018 04:34) (114/69 - 158/83)  BP(mean): --  RR: 18 (2018 04:34) (16 - 18)  SpO2: 96% (2018 04:34) (94% - 96%)  T(C): 36.6 (18 @ 04:34), Max: 37.1 (18 @ 21:37)  HR: 70 (18 @ 04:34) (67 - 75)  BP: 145/66 (18 @ 04:34) (114/69 - 158/83)  RR: 18 (18 @ 04:34) (16 - 18)  SpO2: 96% (18 @ 04:34) (94% - 96%)  CAPILLARY BLOOD GLUCOSE        I&O's Summary    2018 07:01  -  2018 07:00  --------------------------------------------------------  IN: 100 mL / OUT: 700 mL / NET: -600 mL    2018 07:01  -  2018 13:08  --------------------------------------------------------  IN: 320 mL / OUT: 250 mL / NET: 70 mL        PHYSICAL EXAM:    GENERAL:  NAD           [ ] Alert     [ ] lethargic   [ ] Agitated   [ ] Cachexia   HEENT: NCAT, FABIANA, EOMI      [ ] Moist Oral Mucosa      [ ] Dry Oral Mucosa    [ ] ET Tube    [ ] Trach     NECK: Supple, no JVD  BREASTS: deferred  BACK: No CVA tenderness, no spinal tenderness   [  ] Kyphosis   [  ] Scoliosis  RESPIRATORY: CTAB, no accessory muscle use      [ ] Tachypnea   [ ] Rhonchi  [ ] Crackles [ ] Wheezing   [ ] Audible excessive secretions   CARDIOVASCULAR: Regular S1S2                              [ ]  Murmur   [ ] Rub    [ ] Gallop  GI: +BS, soft, NT, ND, no guarding, no rebound tenderness         [ ] PEG  [ ] NGT   :  [ ] goyal  RECTAL: deferred  EXTREMITIES/MSK: FROM, non-tender, no joint swelling   VASCULAR:    [ ]  Edema        [ ] b/l dp pulses palpaple    [ ] Clubbing      [ ] Cyanosis  NEURO: A&Ox     [ ] focal weakness  [ ] facial asymmetry         [ ] Cognitive Impairment  SKIN: [ ] Rash      [ ] Ulcers   LYMPH: no LAD  PSYCH: calm, cooperative, pleasant              LABS:                        11.0   13.5  )-----------( 244      ( 2018 04:37 )             32.6         140  |  102  |  14  ----------------------------<  117<H>  3.7   |  27  |  0.75    Ca    9.0      2018 04:37    TPro  8.0  /  Alb  4.4  /  TBili  0.3  /  DBili  x   /  AST  25  /  ALT  12  /  AlkPhos  86  02-25    PT/INR - ( 2018 04:37 )   PT: 11.4 sec;   INR: 1.05 ratio         PTT - ( 2018 04:37 )  PTT:25.3 sec      Urinalysis Basic - ( 2018 13:08 )    Color: Yellow / Appearance: Clear / S.017 / pH: x  Gluc: x / Ketone: Negative  / Bili: Negative / Urobili: Negative   Blood: x / Protein: Trace / Nitrite: Negative   Leuk Esterase: Negative / RBC: x / WBC x   Sq Epi: x / Non Sq Epi: x / Bacteria: x          RADIOLOGY & ADDITIONAL TESTS:  Reviewed in [facility]   [ ] Personally reviewed     [ ] Primary team and other consultant notes reviewed     Time spent counseling regarding:  [ ] Goals of care		[ ] Resuscitation status        [] Prognosis		[] Hospice     [] Discharge planning	   [] Symptom managemen  [ ] Emotional support	[ ] Bereavement                    [] Care coordination with other disciplines  Family meeting            Start time:		   End time:	         	Total Time:  __ Minutes spend on total encounter: more than 50% of the visit was spent counseling and/or coordinating care Patient is a 86y old  Female who presents with a chief complaint of Right hip pain.     History obtained from chart, primary team,  and son-in-law Brenton at bedside.     ADMISSION HPI:  86y Female ambulator w/ AD presents c/o R hip pain and inability to ambulate sp mechanical fall. pt was found down the AM at home by . pt baseline has dementia and is AOx2. unknown if HS or LOC. Denies numbness/tingling. Denies fever/chills. Denies pain/injury elsewhere.                           12.4   17.5  )-----------( 258      ( 2018 12:24 )             37.1     2018 12:24    143    |  102    |  14     ----------------------------<  133    4.0     |  26     |  0.84     Ca    9.6        2018 12:24    TPro  8.0    /  Alb  4.4    /  TBili  0.3    /  DBili  x      /  AST  25     /  ALT  12     /  AlkPhos  86     2018 12:24    PT/INR - ( 2018 12:24 )   PT: 11.5 sec;   INR: 1.06 ratio         PTT - ( 2018 12:24 )  PTT:23.1 sec  Vital Signs Last 24 Hrs  T(C): 36.9 (18 @ 11:54), Max: 36.9 (18 @ 11:54)  T(F): 98.5 (18 @ 11:54), Max: 98.5 (18 @ 11:54)  HR: 64 (18 @ 12:20) (64 - 70)  BP: 136/79 (18 @ 12:20) (110/73 - 136/79)  BP(mean): --  RR: 20 (18 @ 12:20) (20 - 20)  SpO2: 99% (18 @ 12:20) (99% - 99%) (2018 15:34)      HOSPITAL COURSE:    PAST MEDICAL & SURGICAL HISTORY:  Dementia  Dementia  Dyslipidemia  S/P breast biopsy: Right breast cyst benign.    FAMILY HISTORY:  No pertinent family history in first degree relatives      Social History:     Functional status (PTA):  [ ]  Independent   [ ] Partially dependent    [ ] Dependent   Ambulation status:          [ ] independant  [ ] cane   [ ] Walker   [ ] Wheelchair    [ ] bedbound    PAIN:      [ ] No [ ] Yes:   (Intensity / Location / Radiation / Character / Onset / Duration / Timing / Alleviating/Exacerbating factors)    REVIEW OF SYSTEMS:   Source: [ ] Patient   [ ] Family  [ ] Team    GENERAL/CONSTITUTIONAL:  No fever, chills, pain, weight loss, fatigue         EYES:  No eye pain, visual disturbances, discharge   ENMT: No hearing difficulties, tinnitus, vertigo, sore throat, sinus pain  NECK:  No pain or stiffness   BREASTS: No pain, masses, or nipple discharge  RESPIRATORY: No SOB, cough, wheezing, chills or hemoptysis  CARDIOVASCULAR: No chest pain, palpitations, dizziness, or leg swelling  GASTROINTESTINAL: No abdominal pain, loss of appetite, N/V, diarrhea, constipation, melena, hematochezia    [ ] Stool incontinence   GENITOURINARY: No dysuria, urinary frequency, hematuria       [ ] Urinary incontinence   NEUROLOGICAL: No headaches, memory loss, weakness, numbness, or tremors      [ ] Cognitive impairment   SKIN: No itching, burning, rashes, or lesions   LYMPH NODES: No enlarged glands  ENDOCRINE: No heat or cold intolerance; No hair loss  MUSCULOSKELETAL: No muscle, back, or extremity pain   [ ] Extremity swelling   PSYCHIATRIC: No depression, anxiety, mood swings, or difficulty sleeping  HEME/LYMPH: No easy bruising, or bleeding gums  ALLERGY AND IMMUNOLOGIC: No hives or eczema    [ ] All other review of systems reviewed and negative unless noted above   [ ] Unable to obtain due to poor mentation           No Known Allergies    MEDICATIONS  (STANDING):  ascorbic acid 500 milliGRAM(s) Oral two times a day  docusate sodium 100 milliGRAM(s) Oral three times a day  donepezil 10 milliGRAM(s) Oral at bedtime  folic acid 1 milliGRAM(s) Oral daily  heparin  Injectable 5000 Unit(s) SubCutaneous every 8 hours  lactated ringers. 1000 milliLiter(s) (75 mL/Hr) IV Continuous <Continuous>  multivitamin 1 Tablet(s) Oral daily  simvastatin 20 milliGRAM(s) Oral at bedtime    MEDICATIONS  (PRN):  acetaminophen   Tablet 650 milliGRAM(s) Oral every 6 hours PRN For Temp greater than 38 C (100.4 F)  acetaminophen   Tablet. 650 milliGRAM(s) Oral every 6 hours PRN headache  acetaminophen   Tablet. 975 milliGRAM(s) Oral every 8 hours PRN Mild Pain (1 - 3)  HYDROmorphone  Injectable 0.25 milliGRAM(s) IV Push every 6 hours PRN breakthrough  magnesium hydroxide Suspension 30 milliLiter(s) Oral daily PRN Constipation  ondansetron Injectable 4 milliGRAM(s) IV Push every 4 hours PRN Nausea and/or Vomiting  oxyCODONE    IR 5 milliGRAM(s) Oral every 6 hours PRN severe pain ONLY  traMADol 25 milliGRAM(s) Oral every 4 hours PRN Moderate Pain (4 - 6)      Vital Signs Last 24 Hrs  T(C): 36.6 (2018 04:34), Max: 37.1 (2018 21:37)  T(F): 97.9 (2018 04:34), Max: 98.8 (2018 21:37)  HR: 70 (2018 04:34) (67 - 75)  BP: 145/66 (2018 04:34) (114/69 - 158/83)  BP(mean): --  RR: 18 (2018 04:34) (16 - 18)  SpO2: 96% (2018 04:34) (94% - 96%)  T(C): 36.6 (18 @ 04:34), Max: 37.1 (18 @ 21:37)  HR: 70 (18 @ 04:34) (67 - 75)  BP: 145/66 (18 @ 04:34) (114/69 - 158/83)  RR: 18 (18 @ 04:34) (16 - 18)  SpO2: 96% (18 @ 04:34) (94% - 96%)  CAPILLARY BLOOD GLUCOSE        I&O's Summary    2018 07:01  -  2018 07:00  --------------------------------------------------------  IN: 100 mL / OUT: 700 mL / NET: -600 mL    2018 07:01  -  2018 13:08  --------------------------------------------------------  IN: 320 mL / OUT: 250 mL / NET: 70 mL        PHYSICAL EXAM:    GENERAL:  NAD           [ ] Alert     [ ] lethargic   [ ] Agitated   [ ] Cachexia   HEENT: NCAT, FABIANA, EOMI      [ ] Moist Oral Mucosa      [ ] Dry Oral Mucosa    [ ] ET Tube    [ ] Trach     NECK: Supple, no JVD  BREASTS: deferred  BACK: No CVA tenderness, no spinal tenderness   [  ] Kyphosis   [  ] Scoliosis  RESPIRATORY: CTAB, no accessory muscle use      [ ] Tachypnea   [ ] Rhonchi  [ ] Crackles [ ] Wheezing   [ ] Audible excessive secretions   CARDIOVASCULAR: Regular S1S2                              [ ]  Murmur   [ ] Rub    [ ] Gallop  GI: +BS, soft, NT, ND, no guarding, no rebound tenderness         [ ] PEG  [ ] NGT   :  [ ] goyal  RECTAL: deferred  EXTREMITIES/MSK: FROM, non-tender, no joint swelling   VASCULAR:    [ ]  Edema        [ ] b/l dp pulses palpaple    [ ] Clubbing      [ ] Cyanosis  NEURO: A&Ox     [ ] focal weakness  [ ] facial asymmetry         [ ] Cognitive Impairment  SKIN: [ ] Rash      [ ] Ulcers   LYMPH: no LAD  PSYCH: calm, cooperative, pleasant              LABS:                        11.0   13.5  )-----------( 244      ( 2018 04:37 )             32.6         140  |  102  |  14  ----------------------------<  117<H>  3.7   |  27  |  0.75    Ca    9.0      2018 04:37    TPro  8.0  /  Alb  4.4  /  TBili  0.3  /  DBili  x   /  AST  25  /  ALT  12  /  AlkPhos  86  02-    PT/INR - ( 2018 04:37 )   PT: 11.4 sec;   INR: 1.05 ratio         PTT - ( 2018 04:37 )  PTT:25.3 sec      Urinalysis Basic - ( 2018 13:08 )    Color: Yellow / Appearance: Clear / S.017 / pH: x  Gluc: x / Ketone: Negative  / Bili: Negative / Urobili: Negative   Blood: x / Protein: Trace / Nitrite: Negative   Leuk Esterase: Negative / RBC: x / WBC x   Sq Epi: x / Non Sq Epi: x / Bacteria: x          RADIOLOGY & ADDITIONAL TESTS:  Reviewed in [facility]   [ ] Personally reviewed     [ ] Primary team and other consultant notes reviewed     Time spent counseling regarding:  [ ] Goals of care		[ ] Resuscitation status        [] Prognosis		[] Hospice     [] Discharge planning	   [] Symptom managemen  [ ] Emotional support	[ ] Bereavement                    [] Care coordination with other disciplines  Family meeting            Start time:		   End time:	         	Total Time:  __ Minutes spend on total encounter: more than 50% of the visit was spent counseling and/or coordinating care Patient is a 86y old  Female who presents with a chief complaint of Right hip pain.     History obtained from chart, primary team,  and son-in-law Brenton at bedside.     ADMISSION HPI:  86y Female ambulator w/ AD presents c/o R hip pain and inability to ambulate sp mechanical fall. pt was found down the AM at home by . pt baseline has dementia and is AOx2. unknown if HS or LOC. Denies numbness/tingling. Denies fever/chills. Denies pain/injury elsewhere.     HOSPITAL COURSE:  Found to have an acute comminuted R IT Fx of R femur - planned for surgical intervention on 18.     PAST MEDICAL & SURGICAL HISTORY:  Dementia  Dyslipidemia  S/P breast biopsy: Right breast cyst benign.    FAMILY HISTORY:  No pertinent family history in first degree relatives      Social History: never smoker, no EtoH abuse, no drug use. Lives with  who is primary caretaker.  Has 3 children who are supportive and involved. Pt and  go out to eat their meals or have family who bring cooked food.  Pt able to bathe and dress herself prior to current events, otherwise  helps with iADLs. Pt is able to use telephone and call her family.      Functional status (PTA):  [ ]  Independent   [x ] Partially dependent    [ ] Dependent   Ambulation status:          [ x] independant  [ ] cane   [ ] Walker   [ ] Wheelchair    [ ] bedbound    PAIN:      [x ] No [ ] Yes:       REVIEW OF SYSTEMS:   Source: [x ] Patient   [ ] Family  [ ] Team  [x ] All other review of systems reviewed and negative unless noted above   [ ] Unable to obtain due to poor mentation       No Known Allergies    MEDICATIONS  (STANDING):  ascorbic acid 500 milliGRAM(s) Oral two times a day  docusate sodium 100 milliGRAM(s) Oral three times a day  donepezil 10 milliGRAM(s) Oral at bedtime  folic acid 1 milliGRAM(s) Oral daily  heparin  Injectable 5000 Unit(s) SubCutaneous every 8 hours  lactated ringers. 1000 milliLiter(s) (75 mL/Hr) IV Continuous <Continuous>  multivitamin 1 Tablet(s) Oral daily  simvastatin 20 milliGRAM(s) Oral at bedtime    MEDICATIONS  (PRN):  acetaminophen   Tablet 650 milliGRAM(s) Oral every 6 hours PRN For Temp greater than 38 C (100.4 F)  acetaminophen   Tablet. 650 milliGRAM(s) Oral every 6 hours PRN headache  acetaminophen   Tablet. 975 milliGRAM(s) Oral every 8 hours PRN Mild Pain (1 - 3)  HYDROmorphone  Injectable 0.25 milliGRAM(s) IV Push every 6 hours PRN breakthrough  magnesium hydroxide Suspension 30 milliLiter(s) Oral daily PRN Constipation  ondansetron Injectable 4 milliGRAM(s) IV Push every 4 hours PRN Nausea and/or Vomiting  oxyCODONE    IR 5 milliGRAM(s) Oral every 6 hours PRN severe pain ONLY  traMADol 25 milliGRAM(s) Oral every 4 hours PRN Moderate Pain (4 - 6)      Vital Signs Last 24 Hrs  T(C): 36.6 (2018 04:34), Max: 37.1 (2018 21:37)  T(F): 97.9 (2018 04:34), Max: 98.8 (2018 21:37)  HR: 70 (:34) (67 - 75)  BP: 145/66 (2018 04:34) (114/69 - 158/83)  BP(mean): --  RR: 18 (2018 04:34) (16 - 18)  SpO2: 96% (2018 04:34) (94% - 96%)  T(C): 36.6 (18 @ 04:34), Max: 37.1 (18 @ 21:37)  HR: 70 (18 @ 04:34) (67 - 75)  BP: 145/66 (18 @ 04:34) (114/69 - 158/83)  RR: 18 (18 @ 04:34) (16 - 18)  SpO2: 96% (18 @ 04:34) (94% - 96%)  CAPILLARY BLOOD GLUCOSE        I&O's Summary    2018 07:01  -  2018 07:00  --------------------------------------------------------  IN: 100 mL / OUT: 700 mL / NET: -600 mL    2018 07:01  -  2018 13:08  --------------------------------------------------------  IN: 320 mL / OUT: 250 mL / NET: 70 mL        PHYSICAL EXAM:    GENERAL:  NAD           [x ] Alert     [ ] lethargic   [ ] Agitated   [ ] Cachexia   HEENT: NCAT, FABIANA, EOMI      [ ] Moist Oral Mucosa      [x ] Dry Oral Mucosa    [ ] ET Tube    [ ] Trach     NECK: Supple, no JVD  BREASTS: deferred  BACK: No CVA tenderness, no spinal tenderness   [  ] Kyphosis   [  ] Scoliosis  RESPIRATORY: CTAB, no accessory muscle use      [ ] Tachypnea   [ ] Rhonchi  [ ] Crackles [ ] Wheezing   [ ] Audible excessive secretions   CARDIOVASCULAR: Regular S1S2                              [ ]  Murmur   [ ] Rub    [ ] Gallop  GI: +BS, soft, NT, ND, no guarding, no rebound tenderness         [ ] PEG  [ ] NGT   :  [x ] goyal  RECTAL: deferred  EXTREMITIES/MSK: RLE shortened and externally rotated, R hip tenderness and limited ROM d/t pain    VASCULAR:    [ ]  Edema        [ x] b/l dp pulses palpaple    [ ] Clubbing      [ ] Cyanosis  NEURO: A&Ox1     [ ] focal weakness  [ ] facial asymmetry         [x ] Cognitive Impairment  SKIN: [ ] Rash      [ ] Ulcers   LYMPH: no LAD  PSYCH: calm, cooperative, pleasant              LABS:                        11.0   13.5  )-----------( 244      ( 2018 04:37 )             32.6     02-    140  |  102  |  14  ----------------------------<  117<H>  3.7   |  27  |  0.75    Ca    9.0      2018 04:37    TPro  8.0  /  Alb  4.4  /  TBili  0.3  /  DBili  x   /  AST  25  /  ALT  12  /  AlkPhos  86  02-25    PT/INR - ( 2018 04:37 )   PT: 11.4 sec;   INR: 1.05 ratio         PTT - ( 2018 04:37 )  PTT:25.3 sec      Urinalysis Basic - ( 2018 13:08 )    Color: Yellow / Appearance: Clear / S.017 / pH: x  Gluc: x / Ketone: Negative  / Bili: Negative / Urobili: Negative   Blood: x / Protein: Trace / Nitrite: Negative   Leuk Esterase: Negative / RBC: x / WBC x   Sq Epi: x / Non Sq Epi: x / Bacteria: x    RADIOLOGY & ADDITIONAL TESTS:  Reviewed in [facility]     [x ] Primary team and other consultant notes reviewed

## 2018-02-26 NOTE — PROGRESS NOTE ADULT - SUBJECTIVE AND OBJECTIVE BOX
SUBJECTIVE: Asymptomatic  	  MEDICATIONS:  acetaminophen   Tablet 650 milliGRAM(s) Oral every 6 hours PRN  acetaminophen   Tablet. 650 milliGRAM(s) Oral every 6 hours PRN  diphenhydrAMINE   Capsule 25 milliGRAM(s) Oral at bedtime PRN  HYDROmorphone  Injectable 0.25 milliGRAM(s) IV Push every 6 hours PRN  ondansetron Injectable 4 milliGRAM(s) IV Push every 4 hours PRN  oxyCODONE    IR 10 milliGRAM(s) Oral every 4 hours PRN  oxyCODONE    IR 5 milliGRAM(s) Oral every 4 hours PRN  docusate sodium 100 milliGRAM(s) Oral three times a day  magnesium hydroxide Suspension 30 milliLiter(s) Oral daily PRN  ascorbic acid 500 milliGRAM(s) Oral two times a day  ferrous    sulfate 325 milliGRAM(s) Oral three times a day with meals  folic acid 1 milliGRAM(s) Oral daily  heparin  Injectable 5000 Unit(s) SubCutaneous every 8 hours  lactated ringers. 1000 milliLiter(s) IV Continuous <Continuous>  multivitamin 1 Tablet(s) Oral daily      REVIEW OF SYSTEMS:    CONSTITUTIONAL: No fever, weight loss, or fatigue  EYES: No eye pain, visual disturbances, or discharge  NECK: No pain or stiffness  RESPIRATORY: No cough, wheezing, chills or hemoptysis; No Shortness of Breath  CARDIOVASCULAR: No chest pain, palpitations, dizziness, or leg swelling  GASTROINTESTINAL: No abdominal or epigastric pain. No nausea, vomiting, or hematemesis; No diarrhea or constipation. No melena or hematochezia.  GENITOURINARY: No dysuria, frequency, hematuria, or incontinence  NEUROLOGICAL: No headaches, memory loss, loss of strength, numbness, or tremors  SKIN: No itching, burning, rashes, or lesions   LYMPH Nodes: No enlarged glands  MUSCULOSKELETAL: No joint pain or swelling; No muscle, back, or extremity pain  All other review of systems are negative.  	      PHYSICAL EXAM:  T(C): 36.6 (02-26-18 @ 04:34), Max: 37.1 (02-25-18 @ 21:37)  HR: 70 (02-26-18 @ 04:34) (64 - 75)  BP: 145/66 (02-26-18 @ 04:34) (110/73 - 158/83)  RR: 18 (02-26-18 @ 04:34) (16 - 20)  SpO2: 96% (02-26-18 @ 04:34) (94% - 99%)  Wt(kg): --  I&O's Summary    25 Feb 2018 07:01  -  26 Feb 2018 07:00  --------------------------------------------------------  IN: 100 mL / OUT: 700 mL / NET: -600 mL      Height (cm): 154.94 (02-25 @ 17:19)  Weight (kg): 48.5 (02-25 @ 17:19)  BMI (kg/m2): 20.2 (02-25 @ 17:19)  BSA (m2): 1.45 (02-25 @ 17:19)    PHYSICAL EXAM    Appearance: Normal	  HEENT:   Normal oral mucosa, PERRL, EOMI	  NECK: Soft and supple, No LAD, No JVD  Cardiovascular: Regular Rate and Rhythm, Normal S1 S2, No murmurs, No clicks, gallops or rubs  Respiratory: Lungs clear to auscultation	  Gastrointestinal:  Soft, Non-tender, + BS	  Skin: No rashes, No ecchymoses, No cyanosis  Neurologic: Non-focal  Extremities: No clubbing, cyanosis or edema  Vascular: Peripheral pulses palpable 2+ bilaterally      LABS:	 	                        11.0   13.5  )-----------( 244      ( 26 Feb 2018 04:37 )             32.6     02-26    140  |  102  |  14  ----------------------------<  117<H>  3.7   |  27  |  0.75    Ca    9.0      26 Feb 2018 04:37    TPro  8.0  /  Alb  4.4  /  TBili  0.3  /  DBili  x   /  AST  25  /  ALT  12  /  AlkPhos  86  02-25

## 2018-02-26 NOTE — CONSULT NOTE ADULT - PROBLEM SELECTOR RECOMMENDATION 3
- Would benefit from Ca/Vit D supplementation  - check Vit D level  - Would benefit from eventual outpatient f/u with endocrine for further evaluation/treatment

## 2018-02-26 NOTE — CONSULT NOTE ADULT - PROBLEM SELECTOR RECOMMENDATION 2
- High risk for delirium  - Avoid sedatives/mood altering medications when possible  - Frequent re-orientation  - Assistance w/ ADLs  - Increased supervision for safety (patient disoriented at baseline with poor judgment  - Fall precautions, aspiration precautions  - If severely agitated and at risk of harm to self or others around her only then would try antipsychotic (Seroquel starting at dose of 12.5mg q6h PRN), otherwise would try behavioral interventions for distraction/re-direction, monitor QTc if using antipsychotic (Qtc on admission almost 500) - Suspect Moderate Likely Alzheimer's type dementia w/o BPSD  - Very high risk for delirium  - Avoid sedatives/mood altering medications when possible  - Frequent re-orientation and needs assistance w/ ADLs  - Increased supervision for safety (patient disoriented at baseline with poor judgment  - Fall precautions, aspiration precautions  - If severely agitated and at risk of harm to self or others around her only then would try antipsychotic (Seroquel starting at dose of 12.5mg q6h PRN), otherwise would try behavioral interventions for distraction/re-direction, monitor QTc if using antipsychotic (Qtc on admission almost 500)  - c/w Donepezil for now - Suspect Moderate Likely Alzheimer's type dementia w/o BPSD  - Very high risk for delirium  - Would check STAT CT head w/o contrast to r/o ICH or other intracranial pathology if change in mental or neurologic status as unclear history of unwitnessed fall and patient is a limited historian   - Avoid sedatives/mood altering medications when possible  - Frequent re-orientation and needs assistance w/ ADLs  - Increased supervision for safety (patient disoriented at baseline with poor judgment  - Fall precautions, aspiration precautions  - If severely agitated and at risk of harm to self or others around her only then would try antipsychotic (Seroquel starting at dose of 12.5mg q6h PRN), otherwise would try behavioral interventions for distraction/re-direction, monitor QTc if using antipsychotic (Qtc on admission almost 500)  - c/w Donepezil for now

## 2018-02-26 NOTE — CHART NOTE - NSCHARTNOTEFT_GEN_A_CORE
Orthopedic Pre-op Check List    Procedure:  Open Reduction Internal Fixation / Surgical Repair / IM Nail r Hip  Dr Fitch                          11.0   13.5  )-----------( 244      ( 2018 04:37 )             32.6       PT/INR - ( 2018 04:37 )   PT: 11.4 sec;   INR: 1.05 ratio         PTT - ( 2018 04:37 )  PTT:25.3 sec        140  |  102  |  14  ----------------------------<  117<H>  3.7   |  27  |  0.75    Ca    9.0      2018 04:37    TPro  8.0  /  Alb  4.4  /  TBili  0.3  /  DBili  x   /  AST  25  /  ALT  12  /  AlkPhos  86        Urinalysis Basic - ( 2018 13:08 )    Color: Yellow / Appearance: Clear / S.017 / pH: x  Gluc: x / Ketone: Negative  / Bili: Negative / Urobili: Negative   Blood: x / Protein: Trace / Nitrite: Negative   Leuk Esterase: Negative / RBC: x / WBC x   Sq Epi: x / Non Sq Epi: x / Bacteria: x        Type and Screen:  @ 12:36 RH:Positive ABO:A Antibody:-- --  Type and Screen:  @ 12:30 RH:Positive ABO:A Antibody:Negative --      CXR:    IMPRESSION:    The cardiac silhouette is magnified on this portable exam. There is   apparent mediastinal widening, which may be due to projection and   tortuous brachiocephalic vessels. There is a probable left diaphragmatic   versus hiatal hernia. The evaluation for pneumothorax is limited on this   exam. There are no focal pulmonary consolidations. The bones are   osteopenic. There is scoliosis and degenerative changes of the spine.        EKG:  Diagnosis Line NORMAL SINUS RHYTHM  SEPTAL INFARCT , AGE UNDETERMINED  ABNORMAL ECG      History and Physical Complete? [x] Yes [ ] No        Clearance on chart? [x] Yes [ ] No    NPO Orders             Diet [x ] Yes [ ] No             IVF [x ] Yes [ ] No             AntiCoag Held [x ] Yes [ ] No             RX [x ] Yes [ ] No             Labs [x ] Yes [ ] No      ***See Above  Heron RENDON  Orthopedics  B: [id number]  S: [phone]

## 2018-02-26 NOTE — CONSULT NOTE ADULT - ATTENDING COMMENTS
Thank you for letting me take part  in the care of this patient.  Please don't hesitate to call me anytime if questions or concerns.     Traci Paul MD  [facility]  Division of Geriatrics & Palliative Medicine  [address], [facility] [address]  [place], [place] [id number] [phone] (office)  [phone] (cell)  [email or web address]

## 2018-02-26 NOTE — CONSULT NOTE ADULT - ASSESSMENT
87 y/o Woman w/ PMHx of Dementia, HLD, who presented w/ R hip pain after found on cuauhtemoc floor by  - admitted w/ R hip Fx and planned for surgical intervention tomorrow.

## 2018-02-27 DIAGNOSIS — R52 PAIN, UNSPECIFIED: ICD-10-CM

## 2018-02-27 LAB
ANION GAP SERPL CALC-SCNC: 11 MMOL/L — SIGNIFICANT CHANGE UP (ref 5–17)
ANION GAP SERPL CALC-SCNC: 14 MMOL/L — SIGNIFICANT CHANGE UP (ref 5–17)
APTT BLD: 24.6 SEC — LOW (ref 27.5–37.4)
BUN SERPL-MCNC: 12 MG/DL — SIGNIFICANT CHANGE UP (ref 7–23)
BUN SERPL-MCNC: 14 MG/DL — SIGNIFICANT CHANGE UP (ref 7–23)
CALCIUM SERPL-MCNC: 8.7 MG/DL — SIGNIFICANT CHANGE UP (ref 8.4–10.5)
CALCIUM SERPL-MCNC: 8.8 MG/DL — SIGNIFICANT CHANGE UP (ref 8.4–10.5)
CHLORIDE SERPL-SCNC: 100 MMOL/L — SIGNIFICANT CHANGE UP (ref 96–108)
CHLORIDE SERPL-SCNC: 101 MMOL/L — SIGNIFICANT CHANGE UP (ref 96–108)
CO2 SERPL-SCNC: 24 MMOL/L — SIGNIFICANT CHANGE UP (ref 22–31)
CO2 SERPL-SCNC: 25 MMOL/L — SIGNIFICANT CHANGE UP (ref 22–31)
CREAT SERPL-MCNC: 0.71 MG/DL — SIGNIFICANT CHANGE UP (ref 0.5–1.3)
CREAT SERPL-MCNC: 0.72 MG/DL — SIGNIFICANT CHANGE UP (ref 0.5–1.3)
GLUCOSE SERPL-MCNC: 100 MG/DL — HIGH (ref 70–99)
GLUCOSE SERPL-MCNC: 98 MG/DL — SIGNIFICANT CHANGE UP (ref 70–99)
HCT VFR BLD CALC: 30 % — LOW (ref 34.5–45)
HCT VFR BLD CALC: 35.7 % — SIGNIFICANT CHANGE UP (ref 34.5–45)
HGB BLD-MCNC: 10.2 G/DL — LOW (ref 11.5–15.5)
HGB BLD-MCNC: 12.2 G/DL — SIGNIFICANT CHANGE UP (ref 11.5–15.5)
INR BLD: 1.09 RATIO — SIGNIFICANT CHANGE UP (ref 0.88–1.16)
MCHC RBC-ENTMCNC: 32.4 PG — SIGNIFICANT CHANGE UP (ref 27–34)
MCHC RBC-ENTMCNC: 32.9 PG — SIGNIFICANT CHANGE UP (ref 27–34)
MCHC RBC-ENTMCNC: 33.9 GM/DL — SIGNIFICANT CHANGE UP (ref 32–36)
MCHC RBC-ENTMCNC: 34.1 GM/DL — SIGNIFICANT CHANGE UP (ref 32–36)
MCV RBC AUTO: 95.1 FL — SIGNIFICANT CHANGE UP (ref 80–100)
MCV RBC AUTO: 97.1 FL — SIGNIFICANT CHANGE UP (ref 80–100)
PLATELET # BLD AUTO: 209 K/UL — SIGNIFICANT CHANGE UP (ref 150–400)
PLATELET # BLD AUTO: 210 K/UL — SIGNIFICANT CHANGE UP (ref 150–400)
POTASSIUM SERPL-MCNC: 3.8 MMOL/L — SIGNIFICANT CHANGE UP (ref 3.5–5.3)
POTASSIUM SERPL-MCNC: 3.9 MMOL/L — SIGNIFICANT CHANGE UP (ref 3.5–5.3)
POTASSIUM SERPL-SCNC: 3.8 MMOL/L — SIGNIFICANT CHANGE UP (ref 3.5–5.3)
POTASSIUM SERPL-SCNC: 3.9 MMOL/L — SIGNIFICANT CHANGE UP (ref 3.5–5.3)
PROTHROM AB SERPL-ACNC: 11.9 SEC — SIGNIFICANT CHANGE UP (ref 9.8–12.7)
RBC # BLD: 3.09 M/UL — LOW (ref 3.8–5.2)
RBC # BLD: 3.75 M/UL — LOW (ref 3.8–5.2)
RBC # FLD: 12.2 % — SIGNIFICANT CHANGE UP (ref 10.3–14.5)
RBC # FLD: 12.9 % — SIGNIFICANT CHANGE UP (ref 10.3–14.5)
SODIUM SERPL-SCNC: 137 MMOL/L — SIGNIFICANT CHANGE UP (ref 135–145)
SODIUM SERPL-SCNC: 138 MMOL/L — SIGNIFICANT CHANGE UP (ref 135–145)
WBC # BLD: 10.6 K/UL — HIGH (ref 3.8–10.5)
WBC # BLD: 11.1 K/UL — HIGH (ref 3.8–10.5)
WBC # FLD AUTO: 10.6 K/UL — HIGH (ref 3.8–10.5)
WBC # FLD AUTO: 11.1 K/UL — HIGH (ref 3.8–10.5)

## 2018-02-27 PROCEDURE — 73502 X-RAY EXAM HIP UNI 2-3 VIEWS: CPT | Mod: 26,RT

## 2018-02-27 PROCEDURE — 99233 SBSQ HOSP IP/OBS HIGH 50: CPT

## 2018-02-27 PROCEDURE — 73552 X-RAY EXAM OF FEMUR 2/>: CPT | Mod: 26,RT

## 2018-02-27 RX ORDER — MAGNESIUM HYDROXIDE 400 MG/1
30 TABLET, CHEWABLE ORAL DAILY
Qty: 0 | Refills: 0 | Status: DISCONTINUED | OUTPATIENT
Start: 2018-02-27 | End: 2018-03-02

## 2018-02-27 RX ORDER — SODIUM CHLORIDE 9 MG/ML
1000 INJECTION INTRAMUSCULAR; INTRAVENOUS; SUBCUTANEOUS ONCE
Qty: 0 | Refills: 0 | Status: COMPLETED | OUTPATIENT
Start: 2018-02-28 | End: 2018-02-28

## 2018-02-27 RX ORDER — RIVAROXABAN 15 MG-20MG
10 KIT ORAL DAILY
Qty: 0 | Refills: 0 | Status: DISCONTINUED | OUTPATIENT
Start: 2018-02-28 | End: 2018-03-02

## 2018-02-27 RX ORDER — ACETAMINOPHEN 500 MG
650 TABLET ORAL EVERY 6 HOURS
Qty: 0 | Refills: 0 | Status: DISCONTINUED | OUTPATIENT
Start: 2018-02-27 | End: 2018-02-27

## 2018-02-27 RX ORDER — TRAMADOL HYDROCHLORIDE 50 MG/1
25 TABLET ORAL EVERY 4 HOURS
Qty: 0 | Refills: 0 | Status: DISCONTINUED | OUTPATIENT
Start: 2018-02-27 | End: 2018-03-02

## 2018-02-27 RX ORDER — DOCUSATE SODIUM 100 MG
100 CAPSULE ORAL THREE TIMES A DAY
Qty: 0 | Refills: 0 | Status: DISCONTINUED | OUTPATIENT
Start: 2018-02-27 | End: 2018-03-02

## 2018-02-27 RX ORDER — POLYETHYLENE GLYCOL 3350 17 G/17G
17 POWDER, FOR SOLUTION ORAL DAILY
Qty: 0 | Refills: 0 | Status: DISCONTINUED | OUTPATIENT
Start: 2018-02-27 | End: 2018-03-02

## 2018-02-27 RX ORDER — SENNA PLUS 8.6 MG/1
2 TABLET ORAL AT BEDTIME
Qty: 0 | Refills: 0 | Status: DISCONTINUED | OUTPATIENT
Start: 2018-02-27 | End: 2018-03-02

## 2018-02-27 RX ORDER — DONEPEZIL HYDROCHLORIDE 10 MG/1
10 TABLET, FILM COATED ORAL AT BEDTIME
Qty: 0 | Refills: 0 | Status: DISCONTINUED | OUTPATIENT
Start: 2018-02-27 | End: 2018-03-02

## 2018-02-27 RX ORDER — ACETAMINOPHEN 500 MG
750 TABLET ORAL ONCE
Qty: 0 | Refills: 0 | Status: COMPLETED | OUTPATIENT
Start: 2018-02-27 | End: 2018-02-27

## 2018-02-27 RX ORDER — ACETAMINOPHEN 500 MG
975 TABLET ORAL EVERY 8 HOURS
Qty: 0 | Refills: 0 | Status: DISCONTINUED | OUTPATIENT
Start: 2018-02-27 | End: 2018-03-02

## 2018-02-27 RX ORDER — OXYCODONE HYDROCHLORIDE 5 MG/1
5 TABLET ORAL EVERY 4 HOURS
Qty: 0 | Refills: 0 | Status: DISCONTINUED | OUTPATIENT
Start: 2018-02-27 | End: 2018-02-27

## 2018-02-27 RX ORDER — HYDROMORPHONE HYDROCHLORIDE 2 MG/ML
0.25 INJECTION INTRAMUSCULAR; INTRAVENOUS; SUBCUTANEOUS EVERY 6 HOURS
Qty: 0 | Refills: 0 | Status: DISCONTINUED | OUTPATIENT
Start: 2018-02-27 | End: 2018-03-02

## 2018-02-27 RX ORDER — PANTOPRAZOLE SODIUM 20 MG/1
40 TABLET, DELAYED RELEASE ORAL DAILY
Qty: 0 | Refills: 0 | Status: DISCONTINUED | OUTPATIENT
Start: 2018-02-27 | End: 2018-03-02

## 2018-02-27 RX ORDER — ACETAMINOPHEN 500 MG
650 TABLET ORAL EVERY 6 HOURS
Qty: 0 | Refills: 0 | Status: DISCONTINUED | OUTPATIENT
Start: 2018-02-27 | End: 2018-03-02

## 2018-02-27 RX ORDER — TRAMADOL HYDROCHLORIDE 50 MG/1
50 TABLET ORAL EVERY 8 HOURS
Qty: 0 | Refills: 0 | Status: DISCONTINUED | OUTPATIENT
Start: 2018-02-27 | End: 2018-03-02

## 2018-02-27 RX ORDER — MORPHINE SULFATE 50 MG/1
2 CAPSULE, EXTENDED RELEASE ORAL EVERY 4 HOURS
Qty: 0 | Refills: 0 | Status: DISCONTINUED | OUTPATIENT
Start: 2018-02-27 | End: 2018-02-27

## 2018-02-27 RX ORDER — SIMVASTATIN 20 MG/1
20 TABLET, FILM COATED ORAL AT BEDTIME
Qty: 0 | Refills: 0 | Status: DISCONTINUED | OUTPATIENT
Start: 2018-02-27 | End: 2018-03-02

## 2018-02-27 RX ORDER — SODIUM CHLORIDE 9 MG/ML
1000 INJECTION, SOLUTION INTRAVENOUS
Qty: 0 | Refills: 0 | Status: DISCONTINUED | OUTPATIENT
Start: 2018-02-27 | End: 2018-03-02

## 2018-02-27 RX ORDER — SODIUM CHLORIDE 9 MG/ML
1000 INJECTION INTRAMUSCULAR; INTRAVENOUS; SUBCUTANEOUS ONCE
Qty: 0 | Refills: 0 | Status: COMPLETED | OUTPATIENT
Start: 2018-02-27 | End: 2018-02-27

## 2018-02-27 RX ORDER — CEFAZOLIN SODIUM 1 G
2000 VIAL (EA) INJECTION EVERY 8 HOURS
Qty: 0 | Refills: 0 | Status: COMPLETED | OUTPATIENT
Start: 2018-02-27 | End: 2018-02-27

## 2018-02-27 RX ORDER — OXYCODONE HYDROCHLORIDE 5 MG/1
10 TABLET ORAL EVERY 4 HOURS
Qty: 0 | Refills: 0 | Status: DISCONTINUED | OUTPATIENT
Start: 2018-02-27 | End: 2018-02-27

## 2018-02-27 RX ORDER — ONDANSETRON 8 MG/1
4 TABLET, FILM COATED ORAL EVERY 6 HOURS
Qty: 0 | Refills: 0 | Status: DISCONTINUED | OUTPATIENT
Start: 2018-02-27 | End: 2018-03-02

## 2018-02-27 RX ADMIN — Medication 1 TABLET(S): at 18:30

## 2018-02-27 RX ADMIN — SIMVASTATIN 20 MILLIGRAM(S): 20 TABLET, FILM COATED ORAL at 21:01

## 2018-02-27 RX ADMIN — SODIUM CHLORIDE 2000 MILLILITER(S): 9 INJECTION INTRAMUSCULAR; INTRAVENOUS; SUBCUTANEOUS at 12:30

## 2018-02-27 RX ADMIN — Medication 100 MILLIGRAM(S): at 21:01

## 2018-02-27 RX ADMIN — TRAMADOL HYDROCHLORIDE 25 MILLIGRAM(S): 50 TABLET ORAL at 23:11

## 2018-02-27 RX ADMIN — Medication 750 MILLIGRAM(S): at 15:20

## 2018-02-27 RX ADMIN — Medication 300 MILLIGRAM(S): at 15:05

## 2018-02-27 RX ADMIN — Medication 100 MILLIGRAM(S): at 14:48

## 2018-02-27 RX ADMIN — Medication 100 MILLIGRAM(S): at 23:13

## 2018-02-27 RX ADMIN — Medication 300 MILLIGRAM(S): at 21:02

## 2018-02-27 RX ADMIN — TRAMADOL HYDROCHLORIDE 25 MILLIGRAM(S): 50 TABLET ORAL at 23:41

## 2018-02-27 RX ADMIN — SODIUM CHLORIDE 75 MILLILITER(S): 9 INJECTION, SOLUTION INTRAVENOUS at 12:03

## 2018-02-27 RX ADMIN — TRAMADOL HYDROCHLORIDE 25 MILLIGRAM(S): 50 TABLET ORAL at 06:48

## 2018-02-27 RX ADMIN — Medication 750 MILLIGRAM(S): at 21:16

## 2018-02-27 RX ADMIN — DONEPEZIL HYDROCHLORIDE 10 MILLIGRAM(S): 10 TABLET, FILM COATED ORAL at 21:01

## 2018-02-27 NOTE — CHART NOTE - NSCHARTNOTEFT_GEN_A_CORE
Orthopedic POC    Resting without complaints.   Appears comfortable; follows commands   No Chest Pain, SOB, N/V.    T(C): 37 (02-27-18 @ 14:22), Max: 37 (02-27-18 @ 14:22)  HR: 75 (02-27-18 @ 14:22) (63 - 84)  BP: 124/76 (02-27-18 @ 14:22) (116/72 - 155/83)  RR: 18 (02-27-18 @ 14:22) (16 - 18)      Exam:  Alert and [place], No Acute Distress  Card: +S1/S2, RRR  Pulm: CTAB  Abdomen soft / benign  [place]  [Y ]   EXT        RLE       Dressing (s) C/D  [x ]          Calves soft       (+) DF/PF to light touch        No Sensory Deficits noted        1+ pulses    Xray:----prosthesis in good alignment                          12.2   11.1<H> )-----------( 209      ( 27 Feb 2018 11:55 )             35.7      02-27    138  |  100  |  12  ----------------------------<  100<H>  3.8   |  24  |  0.71            A/P: S/p Intramedullary nailing of right femur      -PT/OT-WBAT w/ assistance  -Chk AM Labs  -DVT PPx: Xarelto QD  -Pain Control PO/IV Pain Rx:  LOW DOSE PAIN Rx  -Continue Current Tx        ***See Above  Heron RENDON  Orthopedics  B: [id number]  S: [phone]

## 2018-02-27 NOTE — BRIEF OPERATIVE NOTE - PROCEDURE
Intramedullary nailing of right femur  02/27/2018    Active  [id number] <<-----Click on this checkbox to enter Procedure

## 2018-02-27 NOTE — PROGRESS NOTE ADULT - SUBJECTIVE AND OBJECTIVE BOX
Patient is a 86y old  Female who presents with a chief complaint of left hip pain (2018 17:29)    Admission HPI:  86y Female ambulator w/ AD presents c/o R hip pain and inability to ambulate sp mechanical fall. pt was found down the AM at home by . pt baseline has dementia and is AOx2. unknown if HS or LOC. Denies numbness/tingling. Denies fever/chills. Denies pain/injury elsewhere.     INTERVAL HPI: Patient seen and examined at bedside postop in PACU.  Reports mild pain in LE otherwise denies having any other complaints including CP, SOB, N/V, weakness, other complaints.       PRESENT SYMPTOMS:  Source: [x ] Patient   [ ] Family   [ ] Team     [x ] All other review of systems negative   [ ] Unable to obtain due to poor mentation       Allergies    No Known Allergies    Intolerances        MEDICATIONS  (STANDING):  ceFAZolin   IVPB 2000 milliGRAM(s) IV Intermittent every 8 hours  docusate sodium 100 milliGRAM(s) Oral three times a day  donepezil 10 milliGRAM(s) Oral at bedtime  lactated ringers. 1000 milliLiter(s) (75 mL/Hr) IV Continuous <Continuous>  pantoprazole    Tablet 40 milliGRAM(s) Oral daily  polyethylene glycol 3350 17 Gram(s) Oral daily  simvastatin 20 milliGRAM(s) Oral at bedtime  sodium chloride 0.9% Bolus 1000 milliLiter(s) IV Bolus once    MEDICATIONS  (PRN):  acetaminophen   Tablet 650 milliGRAM(s) Oral every 6 hours PRN For Temp greater than 38 C (100.4 F)  acetaminophen   Tablet. 650 milliGRAM(s) Oral every 6 hours PRN Mild Pain (1 - 3)  aluminum hydroxide/magnesium hydroxide/simethicone Suspension 30 milliLiter(s) Oral four times a day PRN Indigestion  HYDROmorphone  Injectable 0.25 milliGRAM(s) IV Push every 6 hours PRN breakthrough  magnesium hydroxide Suspension 30 milliLiter(s) Oral daily PRN Constipation  morphine  - Injectable 2 milliGRAM(s) IV Push every 4 hours PRN breakthrough  ondansetron Injectable 4 milliGRAM(s) IV Push every 6 hours PRN Nausea and/or Vomiting  oxyCODONE    IR 5 milliGRAM(s) Oral every 4 hours PRN Moderate Pain (4 - 6)  oxyCODONE    IR 10 milliGRAM(s) Oral every 4 hours PRN Severe Pain (7 - 10)  senna 2 Tablet(s) Oral at bedtime PRN Constipation      PHYSICAL EXAM:  Vital Signs Last 24 Hrs  T(C): 36.3 (2018 11:05), Max: 36.8 (2018 14:24)  T(F): 97.3 (2018 11:05), Max: 98.3 (2018 20:52)  HR: 74 (2018 12:00) (63 - 84)  BP: 129/61 (2018 11:45) (116/72 - 155/83)  BP(mean): 85 (2018 11:45) (82 - 86)  RR: 16 (2018 12:00) (16 - 18)  SpO2: 100% (2018 12:00) (95% - 100%)      GENERAL:  NAD           [x ] Alert     [ ] lethargic   [ ] Agitated   [ ] Cachexia   HEENT: NCAT, FABIANA     [ ] Moist Oral Mucosa      [x ] Dry Oral Mucosa    [ ] ET Tube    [ ] Trach     NECK: Supple, no JVD  BREASTS: deferred  BACK: limited exam postop  [  ] Kyphosis   [  ] Scoliosis  RESPIRATORY: CTAB, no accessory muscle use      [ ] Tachypnea   [ ] Rhonchi  [ ] Crackles [ ] Wheezing   [ ] Audible excessive secretions   CARDIOVASCULAR: Regular S1S2                              [ ]  Murmur   [ ] Rub    [ ] Gallop  GI: +BS, soft, NT, ND, no guarding, no rebound tenderness         [ ] PEG  [ ] NGT   :  [x ] goyal  RECTAL: deferred  EXTREMITIES/MSK: able to move feet/toes b/l    VASCULAR:    No  Edema        [ ] b/l dp pulses palpaple    [ ] Clubbing      [ ] Cyanosis  NEURO: A&Ox2     [ ] focal weakness  [ ] facial asymmetry         [x ] Cognitive Impairment  SKIN: [ ] Rash      [ ] Ulcers   PSYCH: calm, cooperative, pleasant          LABORATORIES:                          10.2   10.6  )-----------( 210      ( 2018 05:16 )             30.0       02-    137  |  101  |  14  ----------------------------<  98  3.9   |  25  |  0.72    Ca    8.8      2018 05:16    TPro  8.0  /  Alb  4.4  /  TBili  0.3  /  DBili  x   /  AST  25  /  ALT  12  /  AlkPhos  86  02-25      CAPILLARY BLOOD GLUCOSE          PT/INR - ( 2018 05:16 )   PT: 11.9 sec;   INR: 1.09 ratio         PTT - ( 2018 05:16 )  PTT:24.6 sec    Urinalysis Basic - ( 2018 13:08 )    Color: Yellow / Appearance: Clear / S.017 / pH: x  Gluc: x / Ketone: Negative  / Bili: Negative / Urobili: Negative   Blood: x / Protein: Trace / Nitrite: Negative   Leuk Esterase: Negative / RBC: x / WBC x   Sq Epi: x / Non Sq Epi: x / Bacteria: x        IMAGING: no new imaging

## 2018-02-27 NOTE — BRIEF OPERATIVE NOTE - POST-OP DX
Intertrochanteric fracture of right hip, closed, initial encounter  02/27/2018    Active  Damian Rivas

## 2018-02-27 NOTE — BRIEF OPERATIVE NOTE - PRE-OP DX
Intertrochanteric fracture of right femur, closed, initial encounter  02/27/2018    Active  Damian Rivas

## 2018-02-27 NOTE — PROGRESS NOTE ADULT - SUBJECTIVE AND OBJECTIVE BOX
Hospital Day [2 ]     Patient resting without complaints.  Pleasantly confused ---@ baseline    No chest pain, SOB, N/V.    T(C): 36.4 (02-27-18 @ 04:24), Max: 36.9 (02-26-18 @ 08:00)  HR: 70 (02-27-18 @ 04:24) (65 - 84)  BP: 136/67 (02-27-18 @ 04:24) (116/72 - 155/83)  RR: 18 (02-27-18 @ 04:24) (18 - 18)  SpO2: 96% (02-27-18 @ 04:24) (95% - 97%)  Wt(kg): --    Exam:    EXT:     RLE            (+) shortening w/rotation            (+) motor / sensory to light touch           n/v/i  No significant changes                                                  10.2   10.6  )-----------( 210      ( 27 Feb 2018 05:16 )             30.0    02-27    137  |  101  |  14  ----------------------------<  98  3.9   |  25  |  0.72    Ca    8.8      27 Feb 2018 05:16    TPro  8.0  /  Alb  4.4  /  TBili  0.3  /  DBili  x   /  AST  25  /  ALT  12  /  AlkPhos  86  02-25  PT/INR - ( 27 Feb 2018 05:16 )   PT: 11.9 sec;   INR: 1.09 ratio         PTT - ( 27 Feb 2018 05:16 )  PTT:24.6 sec    A/P  Patient S/P----.     PT:  Check Labs   DVT PPx  Pain Control  Continue Current Tx.  D/C planning in progress

## 2018-02-27 NOTE — PROGRESS NOTE ADULT - PROBLEM SELECTOR PLAN 3
- Would benefit from Ca/Vit D supplementation  - check Vit D level  - Would benefit from eventual outpatient f/u with endocrine for further evaluation/treatment.

## 2018-02-28 LAB
ANION GAP SERPL CALC-SCNC: 11 MMOL/L — SIGNIFICANT CHANGE UP (ref 5–17)
BUN SERPL-MCNC: 8 MG/DL — SIGNIFICANT CHANGE UP (ref 7–23)
CALCIUM SERPL-MCNC: 8.4 MG/DL — SIGNIFICANT CHANGE UP (ref 8.4–10.5)
CHLORIDE SERPL-SCNC: 100 MMOL/L — SIGNIFICANT CHANGE UP (ref 96–108)
CO2 SERPL-SCNC: 26 MMOL/L — SIGNIFICANT CHANGE UP (ref 22–31)
CREAT SERPL-MCNC: 0.61 MG/DL — SIGNIFICANT CHANGE UP (ref 0.5–1.3)
GLUCOSE SERPL-MCNC: 97 MG/DL — SIGNIFICANT CHANGE UP (ref 70–99)
HCT VFR BLD CALC: 30.9 % — LOW (ref 34.5–45)
HGB BLD-MCNC: 10.1 G/DL — LOW (ref 11.5–15.5)
MCHC RBC-ENTMCNC: 29.9 PG — SIGNIFICANT CHANGE UP (ref 27–34)
MCHC RBC-ENTMCNC: 32.7 GM/DL — SIGNIFICANT CHANGE UP (ref 32–36)
MCV RBC AUTO: 91.4 FL — SIGNIFICANT CHANGE UP (ref 80–100)
PLATELET # BLD AUTO: 231 K/UL — SIGNIFICANT CHANGE UP (ref 150–400)
POTASSIUM SERPL-MCNC: 3.2 MMOL/L — LOW (ref 3.5–5.3)
POTASSIUM SERPL-SCNC: 3.2 MMOL/L — LOW (ref 3.5–5.3)
RBC # BLD: 3.38 M/UL — LOW (ref 3.8–5.2)
RBC # FLD: 15.2 % — HIGH (ref 10.3–14.5)
SODIUM SERPL-SCNC: 137 MMOL/L — SIGNIFICANT CHANGE UP (ref 135–145)
WBC # BLD: 11.47 K/UL — HIGH (ref 3.8–10.5)
WBC # FLD AUTO: 11.47 K/UL — HIGH (ref 3.8–10.5)

## 2018-02-28 PROCEDURE — 99233 SBSQ HOSP IP/OBS HIGH 50: CPT

## 2018-02-28 RX ORDER — POTASSIUM CHLORIDE 20 MEQ
20 PACKET (EA) ORAL EVERY 6 HOURS
Qty: 0 | Refills: 0 | Status: COMPLETED | OUTPATIENT
Start: 2018-02-28 | End: 2018-03-01

## 2018-02-28 RX ORDER — HALOPERIDOL DECANOATE 100 MG/ML
2 INJECTION INTRAMUSCULAR EVERY 6 HOURS
Qty: 0 | Refills: 0 | Status: DISCONTINUED | OUTPATIENT
Start: 2018-02-28 | End: 2018-03-02

## 2018-02-28 RX ORDER — ACETAMINOPHEN 500 MG
750 TABLET ORAL ONCE
Qty: 0 | Refills: 0 | Status: DISCONTINUED | OUTPATIENT
Start: 2018-02-28 | End: 2018-03-02

## 2018-02-28 RX ORDER — QUETIAPINE FUMARATE 200 MG/1
12.5 TABLET, FILM COATED ORAL AT BEDTIME
Qty: 0 | Refills: 0 | Status: DISCONTINUED | OUTPATIENT
Start: 2018-02-28 | End: 2018-03-02

## 2018-02-28 RX ADMIN — Medication 1 TABLET(S): at 12:06

## 2018-02-28 RX ADMIN — TRAMADOL HYDROCHLORIDE 25 MILLIGRAM(S): 50 TABLET ORAL at 21:40

## 2018-02-28 RX ADMIN — PANTOPRAZOLE SODIUM 40 MILLIGRAM(S): 20 TABLET, DELAYED RELEASE ORAL at 13:15

## 2018-02-28 RX ADMIN — Medication 100 MILLIGRAM(S): at 13:15

## 2018-02-28 RX ADMIN — SODIUM CHLORIDE 2000 MILLILITER(S): 9 INJECTION INTRAMUSCULAR; INTRAVENOUS; SUBCUTANEOUS at 05:27

## 2018-02-28 RX ADMIN — Medication 20 MILLIEQUIVALENT(S): at 18:14

## 2018-02-28 RX ADMIN — RIVAROXABAN 10 MILLIGRAM(S): KIT at 13:15

## 2018-02-28 RX ADMIN — DONEPEZIL HYDROCHLORIDE 10 MILLIGRAM(S): 10 TABLET, FILM COATED ORAL at 21:11

## 2018-02-28 RX ADMIN — Medication 1 TABLET(S): at 05:27

## 2018-02-28 RX ADMIN — Medication 100 MILLIGRAM(S): at 21:11

## 2018-02-28 RX ADMIN — TRAMADOL HYDROCHLORIDE 25 MILLIGRAM(S): 50 TABLET ORAL at 05:30

## 2018-02-28 RX ADMIN — SIMVASTATIN 20 MILLIGRAM(S): 20 TABLET, FILM COATED ORAL at 21:11

## 2018-02-28 RX ADMIN — TRAMADOL HYDROCHLORIDE 25 MILLIGRAM(S): 50 TABLET ORAL at 21:11

## 2018-02-28 RX ADMIN — HALOPERIDOL DECANOATE 2 MILLIGRAM(S): 100 INJECTION INTRAMUSCULAR at 12:06

## 2018-02-28 RX ADMIN — POLYETHYLENE GLYCOL 3350 17 GRAM(S): 17 POWDER, FOR SOLUTION ORAL at 12:06

## 2018-02-28 RX ADMIN — Medication 100 MILLIGRAM(S): at 05:27

## 2018-02-28 RX ADMIN — Medication 1 TABLET(S): at 18:15

## 2018-02-28 NOTE — PROGRESS NOTE ADULT - SUBJECTIVE AND OBJECTIVE BOX
Patient is a 86y old  Female who presents with a chief complaint of R hip pain (25 Feb 2018 17:29)    Admission HPI:  86y Female ambulator w/ AD presents c/o R hip pain and inability to ambulate sp mechanical fall. pt was found down the AM at home by . pt baseline has dementia and is AOx2. unknown if HS or LOC. Denies numbness/tingling. Denies fever/chills. Denies pain/injury elsewhere.     INTERVAL HPI: Patient seen and examined at bedside this morning. No acute overnight events.  Pt is a poor historian w/ limited insight and disoriented but denies having any complaints.       PRESENT SYMPTOMS:  Source: [x ] Patient   [ ] Family   [ ] Team     [x ] All other review of systems negative   [ ] Unable to obtain due to poor mentation       Allergies    No Known Allergies    Intolerances        MEDICATIONS  (STANDING):  acetaminophen  IVPB. 750 milliGRAM(s) IV Intermittent once  calcium carbonate 1250 mG + Vitamin D (OsCal 500 + D) 1 Tablet(s) Oral two times a day  docusate sodium 100 milliGRAM(s) Oral three times a day  donepezil 10 milliGRAM(s) Oral at bedtime  lactated ringers. 1000 milliLiter(s) (75 mL/Hr) IV Continuous <Continuous>  multivitamin 1 Tablet(s) Oral daily  pantoprazole    Tablet 40 milliGRAM(s) Oral daily  polyethylene glycol 3350 17 Gram(s) Oral daily  rivaroxaban 10 milliGRAM(s) Oral daily  simvastatin 20 milliGRAM(s) Oral at bedtime    MEDICATIONS  (PRN):  acetaminophen   Tablet 650 milliGRAM(s) Oral every 6 hours PRN For Temp greater than 38 C (100.4 F)  acetaminophen   Tablet. 975 milliGRAM(s) Oral every 8 hours PRN Mild Pain (1 - 3)  aluminum hydroxide/magnesium hydroxide/simethicone Suspension 30 milliLiter(s) Oral four times a day PRN Indigestion  haloperidol     Tablet 2 milliGRAM(s) Oral every 6 hours PRN agitation  HYDROmorphone  Injectable 0.25 milliGRAM(s) IV Push every 6 hours PRN breakthrough  magnesium hydroxide Suspension 30 milliLiter(s) Oral daily PRN Constipation  ondansetron Injectable 4 milliGRAM(s) IV Push every 6 hours PRN Nausea and/or Vomiting  QUEtiapine 12.5 milliGRAM(s) Oral at bedtime PRN agitation  senna 2 Tablet(s) Oral at bedtime PRN Constipation  traMADol 25 milliGRAM(s) Oral every 4 hours PRN Moderate Pain (4 - 6)  traMADol 50 milliGRAM(s) Oral every 8 hours PRN Severe Pain (7 - 10)      PHYSICAL EXAM:  Vital Signs Last 24 Hrs  T(C): 36.9 (28 Feb 2018 08:06), Max: 37.1 (28 Feb 2018 01:46)  T(F): 98.4 (28 Feb 2018 08:06), Max: 98.8 (28 Feb 2018 01:46)  HR: 80 (28 Feb 2018 08:06) (70 - 90)  BP: 121/63 (28 Feb 2018 08:06) (118/65 - 157/69)  BP(mean): 83 (27 Feb 2018 13:30) (82 - 92)  RR: 18 (28 Feb 2018 08:06) (16 - 18)  SpO2: 96% (28 Feb 2018 08:06) (94% - 99%)    GENERAL:  NAD           [x ] Alert     [ ] lethargic   [ ] Agitated   [ ] Cachexia   HEENT: NCAT, FABIANA     [x ] Moist Oral Mucosa      [ ] Dry Oral Mucosa    [ ] ET Tube    [ ] Trach     NECK: Supple, no JVD  BREASTS: deferred  BACK: no spinal tenderness  [  ] Kyphosis   [  ] Scoliosis  RESPIRATORY: CTAB, no accessory muscle use      [ ] Tachypnea   [ ] Rhonchi  [ ] Crackles [ ] Wheezing   [ ] Audible excessive secretions   CARDIOVASCULAR: Regular S1S2                              [ ]  Murmur   [ ] Rub    [ ] Gallop  GI: +BS, soft, NT, ND, no guarding, no rebound tenderness         [ ] PEG  [ ] NGT   :  [x ] goyal  RECTAL: deferred  EXTREMITIES/MSK: able to move feet/toes b/l    VASCULAR:    No  Edema        [ ] b/l dp pulses palpaple    [ ] Clubbing      [ ] Cyanosis  NEURO: A&Ox1     [ ] focal weakness  [ ] facial asymmetry         [x ] Cognitive Impairment  SKIN: [ ] Rash      [ ] Ulcers   PSYCH: calm, cooperative, pleasant        LABORATORIES:                          10.1   11.47 )-----------( 231      ( 28 Feb 2018 07:33 )             30.9       02-28    137  |  100  |  8   ----------------------------<  97  3.2<L>   |  26  |  0.61    Ca    8.4      28 Feb 2018 07:03        CAPILLARY BLOOD GLUCOSE          PT/INR - ( 27 Feb 2018 05:16 )   PT: 11.9 sec;   INR: 1.09 ratio         PTT - ( 27 Feb 2018 05:16 )  PTT:24.6 sec      IMAGING: reviewed in sunrise Patient is a 86y old  Female who presents with a chief complaint of R hip pain (25 Feb 2018 17:29)    Admission HPI:  86y Female ambulator w/ AD presents c/o R hip pain and inability to ambulate sp mechanical fall. pt was found down the AM at home by . pt baseline has dementia and is AOx2. unknown if HS or LOC. Denies numbness/tingling. Denies fever/chills. Denies pain/injury elsewhere.     INTERVAL HPI: Patient seen and examined at bedside this morning. No acute overnight events.  Pt is a poor historian w/ confusion and limited insight but denies having any complaints.       PRESENT SYMPTOMS:  Source: [x ] Patient   [ ] Family   [ ] Team     [x ] All other review of systems negative   [ ] Unable to obtain due to poor mentation       Allergies    No Known Allergies    Intolerances        MEDICATIONS  (STANDING):  acetaminophen  IVPB. 750 milliGRAM(s) IV Intermittent once  calcium carbonate 1250 mG + Vitamin D (OsCal 500 + D) 1 Tablet(s) Oral two times a day  docusate sodium 100 milliGRAM(s) Oral three times a day  donepezil 10 milliGRAM(s) Oral at bedtime  lactated ringers. 1000 milliLiter(s) (75 mL/Hr) IV Continuous <Continuous>  multivitamin 1 Tablet(s) Oral daily  pantoprazole    Tablet 40 milliGRAM(s) Oral daily  polyethylene glycol 3350 17 Gram(s) Oral daily  rivaroxaban 10 milliGRAM(s) Oral daily  simvastatin 20 milliGRAM(s) Oral at bedtime    MEDICATIONS  (PRN):  acetaminophen   Tablet 650 milliGRAM(s) Oral every 6 hours PRN For Temp greater than 38 C (100.4 F)  acetaminophen   Tablet. 975 milliGRAM(s) Oral every 8 hours PRN Mild Pain (1 - 3)  aluminum hydroxide/magnesium hydroxide/simethicone Suspension 30 milliLiter(s) Oral four times a day PRN Indigestion  haloperidol     Tablet 2 milliGRAM(s) Oral every 6 hours PRN agitation  HYDROmorphone  Injectable 0.25 milliGRAM(s) IV Push every 6 hours PRN breakthrough  magnesium hydroxide Suspension 30 milliLiter(s) Oral daily PRN Constipation  ondansetron Injectable 4 milliGRAM(s) IV Push every 6 hours PRN Nausea and/or Vomiting  QUEtiapine 12.5 milliGRAM(s) Oral at bedtime PRN agitation  senna 2 Tablet(s) Oral at bedtime PRN Constipation  traMADol 25 milliGRAM(s) Oral every 4 hours PRN Moderate Pain (4 - 6)  traMADol 50 milliGRAM(s) Oral every 8 hours PRN Severe Pain (7 - 10)      PHYSICAL EXAM:  Vital Signs Last 24 Hrs  T(C): 36.9 (28 Feb 2018 08:06), Max: 37.1 (28 Feb 2018 01:46)  T(F): 98.4 (28 Feb 2018 08:06), Max: 98.8 (28 Feb 2018 01:46)  HR: 80 (28 Feb 2018 08:06) (70 - 90)  BP: 121/63 (28 Feb 2018 08:06) (118/65 - 157/69)  BP(mean): 83 (27 Feb 2018 13:30) (82 - 92)  RR: 18 (28 Feb 2018 08:06) (16 - 18)  SpO2: 96% (28 Feb 2018 08:06) (94% - 99%)    GENERAL:  NAD           [x ] Alert     [ ] lethargic   [ ] Agitated   [ ] Cachexia   HEENT: NCAT, FABIANA     [x ] Moist Oral Mucosa      [ ] Dry Oral Mucosa    [ ] ET Tube    [ ] Trach     NECK: Supple, no JVD  BREASTS: deferred  BACK: no spinal tenderness  [  ] Kyphosis   [  ] Scoliosis  RESPIRATORY: CTAB, no accessory muscle use      [ ] Tachypnea   [ ] Rhonchi  [ ] Crackles [ ] Wheezing   [ ] Audible excessive secretions   CARDIOVASCULAR: Regular S1S2                              [ ]  Murmur   [ ] Rub    [ ] Gallop  GI: +BS, soft, NT, ND, no guarding, no rebound tenderness         [ ] PEG  [ ] NGT   :  [x ] goyal  RECTAL: deferred  EXTREMITIES/MSK: able to move feet/toes b/l    VASCULAR:    No  Edema        [ ] b/l dp pulses palpaple    [ ] Clubbing      [ ] Cyanosis  NEURO: A&Ox1     [ ] focal weakness  [ ] facial asymmetry         [x ] Cognitive Impairment  SKIN: [ ] Rash      [ ] Ulcers   PSYCH: calm, cooperative, pleasant        LABORATORIES:                          10.1   11.47 )-----------( 231      ( 28 Feb 2018 07:33 )             30.9       02-28    137  |  100  |  8   ----------------------------<  97  3.2<L>   |  26  |  0.61    Ca    8.4      28 Feb 2018 07:03        CAPILLARY BLOOD GLUCOSE          PT/INR - ( 27 Feb 2018 05:16 )   PT: 11.9 sec;   INR: 1.09 ratio         PTT - ( 27 Feb 2018 05:16 )  PTT:24.6 sec      IMAGING: reviewed in sunrise

## 2018-02-28 NOTE — PHYSICAL THERAPY INITIAL EVALUATION ADULT - FOLLOWS COMMANDS/ANSWERS QUESTIONS, REHAB EVAL
50% of the time/pt speaking to her  who was not present, was often off topic during conversation/able to follow single-step instructions

## 2018-02-28 NOTE — PHYSICAL THERAPY INITIAL EVALUATION ADULT - PERTINENT HX OF CURRENT PROBLEM, REHAB EVAL
Pt is an 87yo F with hx of dementia presents s/p fall. pt found to have R femur fx .  pt is now POD 1 s/p R IMN

## 2018-02-28 NOTE — PROGRESS NOTE ADULT - PROBLEM SELECTOR PLAN 1
s/p IMN R hip 2/27 w/o immediate complications   - c/w mgmt per primary/ortho team. - s/p IMN R hip 2/27 w/o immediate complications   - c/w mgmt per primary/ortho team.

## 2018-02-28 NOTE — PROGRESS NOTE ADULT - SUBJECTIVE AND OBJECTIVE BOX
S:  Pt seen an examined. Doing well. Pain is controlled. No acute events overnight.     O:  Vital Signs Last 24 Hrs  T(C): 37 (28 Feb 2018 04:46), Max: 37.1 (28 Feb 2018 01:46)  T(F): 98.6 (28 Feb 2018 04:46), Max: 98.8 (28 Feb 2018 01:46)  HR: 83 (28 Feb 2018 04:46) (63 - 90)  BP: 129/73 (28 Feb 2018 04:46) (118/55 - 157/69)  BP(mean): 83 (27 Feb 2018 13:30) (82 - 92)  RR: 18 (28 Feb 2018 04:46) (16 - 18)  SpO2: 99% (28 Feb 2018 04:46) (94% - 100%)    NAD, Alert      RLE:   Dressing CDI  EHL/FHL/GS/TA 5/5  S/S/SP/DP/T SILT  Toes warm, BCR       A/P: 86F s/p R Femur IMN POD#1  1) Pain control  2) WBAT  3) PT/OOB  4) IS  5) Venodynes  6) DVT Px     SK

## 2018-02-28 NOTE — PROGRESS NOTE ADULT - PROBLEM SELECTOR PLAN 3
- mild pain at present  - Would consider simplifying pain med regimen to Tylenol 650mg q6h standing; oxycodone 2.5mg q4h PRN moderate pain, Oxycodone 5mg q4h PRN severe pain; and adjust dose slowly as needed; would benefit from lowest dose necessary for comfort to avoid delirium  - would c/w bowel regimen while on opioids - colace 100mg TID, Senna 2 tabs qhs, miralax daily PRN for constipation >2 days, can also give Dulcolax supp daily PRN for constipation >3 days. - Suspect Moderate Likely Alzheimer's type dementia w/o BPSD  - Very high risk for delirium  - Avoid sedatives/mood altering medications when possible  - Frequent re-orientation and assistance w/ ADLs  - Increased supervision for safety (patient disoriented at baseline with poor judgment  - Fall precautions, aspiration precautions  - If severely agitated and at risk of harm to self or others around her only then would try antipsychotic (Seroquel starting at dose of 12.5mg q6h PRN), otherwise would try behavioral interventions for distraction/re-direction, monitor QTc if using antipsychotic (Qtc on admission almost 500)  - c/w Donepezil for now - Suspect Moderate Likely Alzheimer's type dementia w/o BPSD  - Very high risk for delirium  - Avoid sedatives/mood altering medications when possible  - Frequent re-orientation and assistance w/ ADLs  - Increased supervision for safety (patient disoriented at baseline with poor judgment  - Fall precautions, aspiration precautions  - If severely agitated and at risk of harm to self or others around her only then would try antipsychotic (Seroquel starting at dose of 12.5mg q6h PRN, and if unable to take PO would give Haldol 0.5mg q6h PRN IM/IV); otherwise would try behavioral interventions for distraction/re-direction, monitor QTc if using antipsychotic (Qtc on admission almost 500)  - c/w Donepezil for now

## 2018-02-28 NOTE — PROGRESS NOTE ADULT - SUBJECTIVE AND OBJECTIVE BOX
SUBJECTIVE:  No CP or SOB. Eating breakfast, pleasant and calm    MEDICATIONS:        acetaminophen   Tablet 650 milliGRAM(s) Oral every 6 hours PRN  acetaminophen   Tablet. 975 milliGRAM(s) Oral every 8 hours PRN  acetaminophen  IVPB. 750 milliGRAM(s) IV Intermittent once  donepezil 10 milliGRAM(s) Oral at bedtime  haloperidol     Tablet 2 milliGRAM(s) Oral every 6 hours PRN  HYDROmorphone  Injectable 0.25 milliGRAM(s) IV Push every 6 hours PRN  ondansetron Injectable 4 milliGRAM(s) IV Push every 6 hours PRN  QUEtiapine 12.5 milliGRAM(s) Oral at bedtime PRN  traMADol 25 milliGRAM(s) Oral every 4 hours PRN  traMADol 50 milliGRAM(s) Oral every 8 hours PRN    aluminum hydroxide/magnesium hydroxide/simethicone Suspension 30 milliLiter(s) Oral four times a day PRN  docusate sodium 100 milliGRAM(s) Oral three times a day  magnesium hydroxide Suspension 30 milliLiter(s) Oral daily PRN  pantoprazole    Tablet 40 milliGRAM(s) Oral daily  polyethylene glycol 3350 17 Gram(s) Oral daily  senna 2 Tablet(s) Oral at bedtime PRN    simvastatin 20 milliGRAM(s) Oral at bedtime    calcium carbonate 1250 mG + Vitamin D (OsCal 500 + D) 1 Tablet(s) Oral two times a day  lactated ringers. 1000 milliLiter(s) IV Continuous <Continuous>  multivitamin 1 Tablet(s) Oral daily  rivaroxaban 10 milliGRAM(s) Oral daily      REVIEW OF SYSTEMS:    CONSTITUTIONAL: No fever, weight loss, or fatigue  EYES: No eye pain, visual disturbances, or discharge  NECK: No pain or stiffness  RESPIRATORY: No cough, wheezing, chills or hemoptysis; No Shortness of Breath  CARDIOVASCULAR: No chest pain, palpitations, dizziness, or leg swelling  GASTROINTESTINAL: No abdominal or epigastric pain. No nausea, vomiting, or hematemesis; No diarrhea or constipation. No melena or hematochezia.  GENITOURINARY: No dysuria, frequency, hematuria, or incontinence  NEUROLOGICAL: No headaches, memory loss, loss of strength, numbness, or tremors  SKIN: No itching, burning, rashes, or lesions   LYMPH Nodes: No enlarged glands  MUSCULOSKELETAL: No joint pain or swelling; No muscle, back, or extremity pain  All other review of systems are negative.  	  [ ] Unable to obtain    PHYSICAL EXAM:  T(C): 36.9 (02-28-18 @ 08:06), Max: 37.1 (02-28-18 @ 01:46)  HR: 80 (02-28-18 @ 08:06) (63 - 90)  BP: 121/63 (02-28-18 @ 08:06) (118/55 - 157/69)  RR: 18 (02-28-18 @ 08:06) (16 - 18)  SpO2: 96% (02-28-18 @ 08:06) (94% - 100%)  Wt(kg): --  I&O's Summary    27 Feb 2018 07:01  -  28 Feb 2018 07:00  --------------------------------------------------------  IN: 2450 mL / OUT: 700 mL / NET: 1750 mL          PHYSICAL EXAM    Appearance: Normal	  HEENT:   Normal oral mucosa, PERRL, EOMI	  NECK: Soft and supple, No LAD, No JVD  Cardiovascular: Regular Rate and Rhythm, Normal S1 S2, soft II/VI JOSE  Respiratory: Lungs clear to auscultation	  Gastrointestinal:  Soft, Non-tender, + BS	  Skin: No rashes, No ecchymoses, No cyanosis  Neurologic: Non-focal  Extremities: 1+ edema  Vascular: Peripheral pulses palpable 2+ bilaterally    LABS:	 	                          10.1   11.47 )-----------( 231      ( 28 Feb 2018 07:33 )             30.9     02-28    137  |  100  |  8   ----------------------------<  97  3.2<L>   |  26  |  0.61    Ca    8.4      28 Feb 2018 07:03

## 2018-03-01 LAB
ANION GAP SERPL CALC-SCNC: 10 MMOL/L — SIGNIFICANT CHANGE UP (ref 5–17)
BUN SERPL-MCNC: 10 MG/DL — SIGNIFICANT CHANGE UP (ref 7–23)
CALCIUM SERPL-MCNC: 8.4 MG/DL — SIGNIFICANT CHANGE UP (ref 8.4–10.5)
CHLORIDE SERPL-SCNC: 103 MMOL/L — SIGNIFICANT CHANGE UP (ref 96–108)
CO2 SERPL-SCNC: 26 MMOL/L — SIGNIFICANT CHANGE UP (ref 22–31)
CREAT SERPL-MCNC: 0.64 MG/DL — SIGNIFICANT CHANGE UP (ref 0.5–1.3)
GLUCOSE SERPL-MCNC: 102 MG/DL — HIGH (ref 70–99)
HCT VFR BLD CALC: 29.4 % — LOW (ref 34.5–45)
HGB BLD-MCNC: 9.8 G/DL — LOW (ref 11.5–15.5)
MCHC RBC-ENTMCNC: 31.4 PG — SIGNIFICANT CHANGE UP (ref 27–34)
MCHC RBC-ENTMCNC: 33.3 GM/DL — SIGNIFICANT CHANGE UP (ref 32–36)
MCV RBC AUTO: 94.2 FL — SIGNIFICANT CHANGE UP (ref 80–100)
PLATELET # BLD AUTO: 231 K/UL — SIGNIFICANT CHANGE UP (ref 150–400)
POTASSIUM SERPL-MCNC: 3.9 MMOL/L — SIGNIFICANT CHANGE UP (ref 3.5–5.3)
POTASSIUM SERPL-SCNC: 3.9 MMOL/L — SIGNIFICANT CHANGE UP (ref 3.5–5.3)
RBC # BLD: 3.12 M/UL — LOW (ref 3.8–5.2)
RBC # FLD: 15.1 % — HIGH (ref 10.3–14.5)
SODIUM SERPL-SCNC: 139 MMOL/L — SIGNIFICANT CHANGE UP (ref 135–145)
WBC # BLD: 9.4 K/UL — SIGNIFICANT CHANGE UP (ref 3.8–10.5)
WBC # FLD AUTO: 9.4 K/UL — SIGNIFICANT CHANGE UP (ref 3.8–10.5)

## 2018-03-01 PROCEDURE — 99233 SBSQ HOSP IP/OBS HIGH 50: CPT

## 2018-03-01 RX ORDER — TRAMADOL HYDROCHLORIDE 50 MG/1
1 TABLET ORAL
Qty: 0 | Refills: 0 | COMMUNITY
Start: 2018-03-01

## 2018-03-01 RX ORDER — DOCUSATE SODIUM 100 MG
1 CAPSULE ORAL
Qty: 0 | Refills: 0 | COMMUNITY
Start: 2018-03-01

## 2018-03-01 RX ORDER — POLYETHYLENE GLYCOL 3350 17 G/17G
17 POWDER, FOR SOLUTION ORAL
Qty: 0 | Refills: 0 | COMMUNITY
Start: 2018-03-01

## 2018-03-01 RX ORDER — SIMVASTATIN 20 MG/1
0 TABLET, FILM COATED ORAL
Qty: 0 | Refills: 0 | COMMUNITY

## 2018-03-01 RX ORDER — ACETAMINOPHEN 500 MG
2 TABLET ORAL
Qty: 0 | Refills: 0 | COMMUNITY
Start: 2018-03-01

## 2018-03-01 RX ORDER — RIVAROXABAN 15 MG-20MG
1 KIT ORAL
Qty: 0 | Refills: 0 | COMMUNITY
Start: 2018-03-01

## 2018-03-01 RX ORDER — DONEPEZIL HYDROCHLORIDE 10 MG/1
0 TABLET, FILM COATED ORAL
Qty: 0 | Refills: 0 | COMMUNITY

## 2018-03-01 RX ORDER — DONEPEZIL HYDROCHLORIDE 10 MG/1
1 TABLET, FILM COATED ORAL
Qty: 0 | Refills: 0 | COMMUNITY
Start: 2018-03-01

## 2018-03-01 RX ORDER — DONEPEZIL HYDROCHLORIDE 10 MG/1
1 TABLET, FILM COATED ORAL
Qty: 0 | Refills: 0 | COMMUNITY

## 2018-03-01 RX ORDER — TRAMADOL HYDROCHLORIDE 50 MG/1
0.5 TABLET ORAL
Qty: 0 | Refills: 0 | COMMUNITY
Start: 2018-03-01

## 2018-03-01 RX ORDER — SENNA PLUS 8.6 MG/1
2 TABLET ORAL
Qty: 0 | Refills: 0 | COMMUNITY
Start: 2018-03-01

## 2018-03-01 RX ORDER — ACETAMINOPHEN 500 MG
3 TABLET ORAL
Qty: 0 | Refills: 0 | COMMUNITY
Start: 2018-03-01

## 2018-03-01 RX ADMIN — Medication 1 TABLET(S): at 12:10

## 2018-03-01 RX ADMIN — TRAMADOL HYDROCHLORIDE 25 MILLIGRAM(S): 50 TABLET ORAL at 06:38

## 2018-03-01 RX ADMIN — SIMVASTATIN 20 MILLIGRAM(S): 20 TABLET, FILM COATED ORAL at 22:26

## 2018-03-01 RX ADMIN — TRAMADOL HYDROCHLORIDE 25 MILLIGRAM(S): 50 TABLET ORAL at 06:08

## 2018-03-01 RX ADMIN — Medication 1 TABLET(S): at 06:09

## 2018-03-01 RX ADMIN — PANTOPRAZOLE SODIUM 40 MILLIGRAM(S): 20 TABLET, DELAYED RELEASE ORAL at 12:10

## 2018-03-01 RX ADMIN — Medication 20 MILLIEQUIVALENT(S): at 01:23

## 2018-03-01 RX ADMIN — DONEPEZIL HYDROCHLORIDE 10 MILLIGRAM(S): 10 TABLET, FILM COATED ORAL at 22:26

## 2018-03-01 RX ADMIN — Medication 100 MILLIGRAM(S): at 06:09

## 2018-03-01 RX ADMIN — Medication 100 MILLIGRAM(S): at 12:10

## 2018-03-01 RX ADMIN — RIVAROXABAN 10 MILLIGRAM(S): KIT at 12:10

## 2018-03-01 RX ADMIN — POLYETHYLENE GLYCOL 3350 17 GRAM(S): 17 POWDER, FOR SOLUTION ORAL at 12:10

## 2018-03-01 RX ADMIN — Medication 100 MILLIGRAM(S): at 22:26

## 2018-03-01 RX ADMIN — Medication 1 TABLET(S): at 17:50

## 2018-03-01 RX ADMIN — Medication 20 MILLIEQUIVALENT(S): at 06:09

## 2018-03-01 NOTE — DISCHARGE NOTE ADULT - CARE PROVIDER_API CALL
Neli Fitch (MD), Orthopaedic Surgery  [address] [place] [id number]  Phone: [phone]  Fax: [phone]

## 2018-03-01 NOTE — DISCHARGE NOTE ADULT - HOSPITAL COURSE
H&P Adult [Charted Location: [facility] 7TOW 707 D1] [Authored: 25-Feb-2018 15:34]- for Visit: [id number], Complete, Revised, Signed in Full, General    History and Physical:   Source of Information	Chart(s), Patient	       History of Present Illness:  Chief Complaint/Reason for Admission: right hip pain/fracture	  History of Present Illness: 	  86y Female ambulator w/ AD presents c/o R hip pain and inability to ambulate sp mechanical fall. pt was found down the AM at home by . pt baseline has dementia and is AOx2. unknown if HS or LOC. Denies numbness/tingling. Denies fever/chills. Denies pain/injury elsewhere.             Allergies and Intolerances:        Allergies:  	No Known Allergies:     Home Medications:   * Patient Currently Takes Medications as of 25-Feb-2018 12:47 documented in Structured Notes  · 	simvastatin: Last Dose Taken:    · 	donepezil: Last Dose Taken:      Patient History:    Past Medical History:  Dementia     Dyslipidemia.     Past Surgical History:  S/P breast biopsy  Right breast cyst benign.    Hospital Course: H&P Adult [Charted Location: [facility] 7TOW 707 D1] [Authored: 25-Feb-2018 15:34]- for Visit: [id number], Complete, Revised, Signed in Full, General    History and Physical:   Source of Information	Chart(s), Patient	       History of Present Illness:  Chief Complaint/Reason for Admission: right hip pain/fracture	  History of Present Illness: 	  86y Female ambulator w/ AD presents c/o R hip pain and inability to ambulate sp mechanical fall. pt was found down the AM at home by . pt baseline has dementia and is AOx2. unknown if HS or LOC. Denies numbness/tingling. Denies fever/chills. Denies pain/injury elsewhere.             Allergies and Intolerances:        Allergies:  	No Known Allergies:     Home Medications:   * Patient Currently Takes Medications as of 25-Feb-2018 12:47 documented in Structured Notes  · 	simvastatin: Last Dose Taken:    · 	donepezil: Last Dose Taken:      Patient History:    Past Medical History:  Dementia     Dyslipidemia.     Past Surgical History:  S/P breast biopsy  Right breast cyst benign.    Hospital Course:   2/25:  Pt admitted and seen by medicine prior to OR  2/27:  Pt underwent Open Reduction Internal Fixation / Surgical Repair / IM Nail R hip. Pt received 1 unit intra-op PRBC's.  Pt was mildly agitated post-op.  Narcotics were held.   Pt tx'd w/ tylenol/ultram/haldol prn.  2/28:  Eval by PT:  Suggest rehab H&P Adult [Charted Location: [facility] 7TOW 707 D1] [Authored: 25-Feb-2018 15:34]- for Visit: [id number], Complete, Revised, Signed in Full, General    History and Physical:   Source of Information	Chart(s), Patient	       History of Present Illness:  Chief Complaint/Reason for Admission: right hip pain/fracture	  History of Present Illness: 	  86y Female ambulator w/ AD presents c/o R hip pain and inability to ambulate sp mechanical fall. pt was found down the AM at home by . pt baseline has dementia and is AOx2. unknown if HS or LOC. Denies numbness/tingling. Denies fever/chills. Denies pain/injury elsewhere.             Allergies and Intolerances:        Allergies:  	No Known Allergies:     Home Medications:   * Patient Currently Takes Medications as of 25-Feb-2018 12:47 documented in Structured Notes  · 	simvastatin: Last Dose Taken:    · 	donepezil: Last Dose Taken:      Patient History:    Past Medical History:  Dementia     Dyslipidemia.     Past Surgical History:  S/P breast biopsy  Right breast cyst benign.    Hospital Course:   2/25:  Pt admitted and seen by medicine prior to OR  2/27:  Pt underwent Open Reduction Internal Fixation / Surgical Repair / IM Nail R hip. Pt received 1 unit intra-op PRBC's.  Pt was mildly agitated post-op.  Narcotics were held.   Pt tx'd w/ tylenol/ultram/haldol prn. Agitation improved  2/28:  Eval by physical therapist for weight bearing as tolerated ambulation with rolling walker and advised that patient would benefit from admission to rehab facility.  3/2:    Pt cleared for discharge to rehab facility

## 2018-03-01 NOTE — PROGRESS NOTE ADULT - SUBJECTIVE AND OBJECTIVE BOX
SUBJECTIVE: The patient denies chest pain, shortness of breath, arm pain or jaw pain, dizziness or palpitations.    	  MEDICATIONS:        acetaminophen   Tablet 650 milliGRAM(s) Oral every 6 hours PRN  acetaminophen   Tablet. 975 milliGRAM(s) Oral every 8 hours PRN  acetaminophen  IVPB. 750 milliGRAM(s) IV Intermittent once  donepezil 10 milliGRAM(s) Oral at bedtime  haloperidol     Tablet 2 milliGRAM(s) Oral every 6 hours PRN  HYDROmorphone  Injectable 0.25 milliGRAM(s) IV Push every 6 hours PRN  ondansetron Injectable 4 milliGRAM(s) IV Push every 6 hours PRN  QUEtiapine 12.5 milliGRAM(s) Oral at bedtime PRN  traMADol 25 milliGRAM(s) Oral every 4 hours PRN  traMADol 50 milliGRAM(s) Oral every 8 hours PRN    aluminum hydroxide/magnesium hydroxide/simethicone Suspension 30 milliLiter(s) Oral four times a day PRN  bisacodyl Suppository 10 milliGRAM(s) Rectal daily PRN  docusate sodium 100 milliGRAM(s) Oral three times a day  magnesium hydroxide Suspension 30 milliLiter(s) Oral daily PRN  pantoprazole    Tablet 40 milliGRAM(s) Oral daily  polyethylene glycol 3350 17 Gram(s) Oral daily  senna 2 Tablet(s) Oral at bedtime PRN    simvastatin 20 milliGRAM(s) Oral at bedtime    calcium carbonate 1250 mG + Vitamin D (OsCal 500 + D) 1 Tablet(s) Oral two times a day  lactated ringers. 1000 milliLiter(s) IV Continuous <Continuous>  multivitamin 1 Tablet(s) Oral daily  rivaroxaban 10 milliGRAM(s) Oral daily      REVIEW OF SYSTEMS:    CONSTITUTIONAL: No fever, weight loss, or fatigue  EYES: No eye pain, visual disturbances, or discharge  NECK: No pain or stiffness  RESPIRATORY: No cough, wheezing, chills or hemoptysis; No Shortness of Breath  CARDIOVASCULAR: No chest pain, palpitations, dizziness, or leg swelling  GASTROINTESTINAL: No abdominal or epigastric pain. No nausea, vomiting, or hematemesis; No diarrhea or constipation. No melena or hematochezia.  GENITOURINARY: No dysuria, frequency, hematuria, or incontinence  NEUROLOGICAL: No headaches, memory loss, loss of strength, numbness, or tremors  SKIN: No itching, burning, rashes, or lesions   LYMPH Nodes: No enlarged glands  MUSCULOSKELETAL: No joint pain or swelling; No muscle, back, or extremity pain  All other review of systems are negative.  	  [ ] Unable to obtain    PHYSICAL EXAM:  T(C): 36.8 (03-01-18 @ 08:23), Max: 37.1 (02-28-18 @ 15:58)  HR: 72 (03-01-18 @ 04:19) (72 - 84)  BP: 115/68 (03-01-18 @ 08:23) (108/50 - 119/61)  RR: 18 (03-01-18 @ 08:23) (18 - 18)  SpO2: 96% (03-01-18 @ 08:23) (95% - 97%)  Wt(kg): --  I&O's Summary    28 Feb 2018 07:01  -  01 Mar 2018 07:00  --------------------------------------------------------  IN: 955 mL / OUT: 1250 mL / NET: -295 mL          PHYSICAL EXAM    Appearance: Normal	  HEENT:   Normal oral mucosa, PERRL, EOMI	  NECK: Soft and supple, No LAD, No JVD  Cardiovascular: Regular Rate and Rhythm, Normal S1 S2, No murmurs, No clicks, gallops or rubs  Respiratory: Lungs clear to auscultation	  Gastrointestinal:  Soft, Non-tender, + BS	  Skin: No rashes, No ecchymoses, No cyanosis  Neurologic: Non-focal  Extremities: No clubbing, cyanosis or edema  Vascular: Peripheral pulses palpable 2+ bilaterally    	    LABS:	 	                                    10.1   11.47 )-----------( 231      ( 28 Feb 2018 07:33 )             30.9     03-01    139  |  103  |  10  ----------------------------<  102<H>  3.9   |  26  |  0.64    Ca    8.4      01 Mar 2018 06:50      proBNP:   Lipid Profile:   HgA1c:   TSH:

## 2018-03-01 NOTE — DISCHARGE NOTE ADULT - PATIENT PORTAL LINK FT
You can access the [facility], offered by [facility] [place], by registering with the following website: [email or web address]

## 2018-03-01 NOTE — PROGRESS NOTE ADULT - SUBJECTIVE AND OBJECTIVE BOX
ORTHO  Patient is a 86y old  Female who presents with a chief complaint of left hip pain (25 Feb 2018 17:29)    Pt. resting without complaint    VS-  T(C): 36.9 (03-01-18 @ 04:19), Max: 37.1 (02-28-18 @ 15:58)  HR: 72 (03-01-18 @ 04:19) (72 - 84)  BP: 113/62 (03-01-18 @ 04:19) (108/50 - 121/63)  RR: 18 (03-01-18 @ 04:19) (18 - 18)  SpO2: 96% (03-01-18 @ 04:19) (95% - 97%)  Wt(kg): --    M.S. A&O  Extremity- Right LE - wounds C/D/I  Neuro-              Motor- (+) ankle DF/PF              Sensation- grossly intact to light touch              Calves- soft, nontender- PAS                               10.1   11.47 )-----------( 231      ( 28 Feb 2018 07:33 )             30.9     02-28    137  |  100  |  8   ----------------------------<  97  3.2<L>   |  26  |  0.61    Ca    8.4      28 Feb 2018 07:03

## 2018-03-01 NOTE — PROGRESS NOTE ADULT - PROBLEM SELECTOR PLAN 5
-  Lenny Goldberg is surrogate decision maker and primary caretaker [phone]  - Pending discussion of ADs/MOLST when  present

## 2018-03-01 NOTE — DISCHARGE NOTE ADULT - PLAN OF CARE
improved ambulation and pain control Keep dressing (s)/incision clean and dry   change dressing EVERY 2 days IF NECESSARY   POD 10-12 (3/7-3/9): CALL SURGEON re: suture removal WBAT w/ assistance  No Active Hip ROM / Active Heel lifts / or RLE straight leg raises till seen by Surgeon  Keep dressing (s)/incision clean and dry   change dressing EVERY 2 days IF NECESSARY   POD 10-12 (3/7-3/9): CALL SURGEON re: suture removal WBAT w/ assistance  No Active Hip ROM / Active Heel lifts / or RLE straight leg raises till seen by Surgeon  Keep dressing (s)/incision clean and dry   change dressing EVERY 2 days IF NECESSARY   POD 10-12 (3/7-3/9): if applicable

## 2018-03-01 NOTE — DISCHARGE NOTE ADULT - ADDITIONAL INSTRUCTIONS
Continue Xarelto 10mg QD  for a total of 6WEEKS   Follow up Dr [facility]  Follow up with your private internist / PMD in 4-6 weeks re: general checkup (and possible medication adjustment)   Please call for an appointment Continue Xarelto 10mg QD  for a total of 6WEEKS   Have surgical sutures/staples removed and steri-strips applied post operative day #14 (if applicable)  Follow up Dr [facility]  Follow up with your private internist / PMD in 4-6 weeks re: general checkup (and possible medication adjustment)   Please call for an appointment

## 2018-03-01 NOTE — DISCHARGE NOTE ADULT - CARE PLAN
Principal Discharge DX:	Closed fracture of right hip, initial encounter  Goal:	improved ambulation and pain control  Assessment and plan of treatment:	Keep dressing (s)/incision clean and dry   change dressing EVERY 2 days IF NECESSARY   POD 10-12 (3/7-3/9): CALL SURGEON re: suture removal Principal Discharge DX:	Closed fracture of right hip, initial encounter  Goal:	improved ambulation and pain control  Assessment and plan of treatment:	WBAT w/ assistance  No Active Hip ROM / Active Heel lifts / or RLE straight leg raises till seen by Surgeon  Keep dressing (s)/incision clean and dry   change dressing EVERY 2 days IF NECESSARY   POD 10-12 (3/7-3/9): CALL SURGEON re: suture removal Principal Discharge DX:	Closed fracture of right hip, initial encounter  Goal:	improved ambulation and pain control  Assessment and plan of treatment:	WBAT w/ assistance  No Active Hip ROM / Active Heel lifts / or RLE straight leg raises till seen by Surgeon  Keep dressing (s)/incision clean and dry   change dressing EVERY 2 days IF NECESSARY   POD 10-12 (3/7-3/9): if applicable

## 2018-03-01 NOTE — PROGRESS NOTE ADULT - SUBJECTIVE AND OBJECTIVE BOX
Patient is a 86y old  Female who presents with a chief complaint of R hip pain, admitted w/ R hip Fx, now s/p Open Reduction Internal Fixation / Surgical Repair / IM Nail L Hip (01 Mar 2018 10:47)    Admission HPI:  86y Female ambulator w/ AD presents c/o R hip pain and inability to ambulate sp mechanical fall. pt was found down the AM at home by . pt baseline has dementia and is AOx2. unknown if HS or LOC. Denies numbness/tingling. Denies fever/chills. Denies pain/injury elsewhere.         INTERVAL HPI: Patient seen and examined at bedside.  Denied having any complaints however limited historian d/t cognitive impairment at baseline.       PRESENT SYMPTOMS:  Source: [x ] Patient   [ ] Family   [ ] Team     [x ] All other review of systems negative       Allergies    No Known Allergies    Intolerances        MEDICATIONS  (STANDING):  acetaminophen  IVPB. 750 milliGRAM(s) IV Intermittent once  calcium carbonate 1250 mG + Vitamin D (OsCal 500 + D) 1 Tablet(s) Oral two times a day  docusate sodium 100 milliGRAM(s) Oral three times a day  donepezil 10 milliGRAM(s) Oral at bedtime  lactated ringers. 1000 milliLiter(s) (75 mL/Hr) IV Continuous <Continuous>  multivitamin 1 Tablet(s) Oral daily  pantoprazole    Tablet 40 milliGRAM(s) Oral daily  polyethylene glycol 3350 17 Gram(s) Oral daily  rivaroxaban 10 milliGRAM(s) Oral daily  simvastatin 20 milliGRAM(s) Oral at bedtime    MEDICATIONS  (PRN):  acetaminophen   Tablet 650 milliGRAM(s) Oral every 6 hours PRN For Temp greater than 38 C (100.4 F)  acetaminophen   Tablet. 975 milliGRAM(s) Oral every 8 hours PRN Mild Pain (1 - 3)  aluminum hydroxide/magnesium hydroxide/simethicone Suspension 30 milliLiter(s) Oral four times a day PRN Indigestion  bisacodyl Suppository 10 milliGRAM(s) Rectal daily PRN If no bowel movement by postoperative day #2  haloperidol     Tablet 2 milliGRAM(s) Oral every 6 hours PRN agitation  HYDROmorphone  Injectable 0.25 milliGRAM(s) IV Push every 6 hours PRN breakthrough  magnesium hydroxide Suspension 30 milliLiter(s) Oral daily PRN Constipation  ondansetron Injectable 4 milliGRAM(s) IV Push every 6 hours PRN Nausea and/or Vomiting  QUEtiapine 12.5 milliGRAM(s) Oral at bedtime PRN agitation  senna 2 Tablet(s) Oral at bedtime PRN Constipation  traMADol 25 milliGRAM(s) Oral every 4 hours PRN Moderate Pain (4 - 6)  traMADol 50 milliGRAM(s) Oral every 8 hours PRN Severe Pain (7 - 10)      PHYSICAL EXAM:  Vital Signs Last 24 Hrs  T(C): 36.8 (01 Mar 2018 08:23), Max: 37.1 (28 Feb 2018 15:58)  T(F): 98.3 (01 Mar 2018 08:23), Max: 98.8 (28 Feb 2018 15:58)  HR: 72 (01 Mar 2018 04:19) (72 - 84)  BP: 115/68 (01 Mar 2018 08:23) (108/50 - 119/61)  BP(mean): --  RR: 18 (01 Mar 2018 08:23) (18 - 18)  SpO2: 96% (01 Mar 2018 08:23) (95% - 97%)    GENERAL:  NAD           [x ] Alert     [ ] lethargic   [ ] Agitated   [ ] Cachexia   HEENT: NCAT, FABIANA     [x ] Moist Oral Mucosa      [ ] Dry Oral Mucosa    [ ] ET Tube    [ ] Trach     NECK: Supple, no JVD  BREASTS: deferred  BACK: no spinal tenderness  [  ] Kyphosis   [  ] Scoliosis  RESPIRATORY: CTAB, no accessory muscle use      [ ] Tachypnea   [ ] Rhonchi  [ ] Crackles [ ] Wheezing   [ ] Audible excessive secretions   CARDIOVASCULAR: Regular S1S2                              [ ]  Murmur   [ ] Rub    [ ] Gallop  GI: +BS, soft, NT, ND, no guarding, no rebound tenderness         [ ] PEG  [ ] NGT   :  [ ] goyal  RECTAL: deferred  EXTREMITIES/MSK: able to move feet/toes b/l    VASCULAR:    No  Edema        [x ] b/l dp pulses palpaple    [ ] Clubbing      [ ] Cyanosis  NEURO: A&Ox1 to self only     [ ] focal weakness  [ ] facial asymmetry         [x ] Cognitive Impairment  SKIN: [ ] Rash      [ ] Ulcers   PSYCH: calm, cooperative, pleasant        LABORATORIES:                          9.8    9.40  )-----------( 231      ( 01 Mar 2018 09:56 )             29.4       03-01    139  |  103  |  10  ----------------------------<  102<H>  3.9   |  26  |  0.64    Ca    8.4      01 Mar 2018 06:50        CAPILLARY BLOOD GLUCOSE      IMAGING: no new imaging

## 2018-03-01 NOTE — DISCHARGE NOTE ADULT - MEDICATION SUMMARY - MEDICATIONS TO TAKE
I will START or STAY ON the medications listed below when I get home from the hospital:    acetaminophen 325 mg oral tablet  -- 2 tab(s) by mouth every 6 hours, As needed, For Temp greater than 38 C (100.4 F)  -- Indication: For fever, H/A    acetaminophen 325 mg oral tablet  -- 3 tab(s) by mouth every 8 hours, As needed, Mild Pain (1 - 3)  -- Indication: For Mild pain    traMADol 50 mg oral tablet  -- 0.5 tab(s) by mouth every 4 hours, As needed, Moderate Pain (4 - 6)  -- Indication: For Moderate pain    traMADol 50 mg oral tablet  -- 1 tab(s) by mouth every 8 hours, As needed, Severe Pain (7 - 10)  -- Indication: For severe pain    rivaroxaban 10 mg oral tablet  -- 1 tab(s) by mouth once a day x 6 weeks TOTAL; then STOP   -- Indication: For Anticoagulation prophylaxis    simvastatin 40 mg oral tablet  -- 1 tab(s) by mouth once a day (at bedtime)  -- Indication: For Antihyperlipidemia    donepezil 10 mg oral tablet  -- 1 tab(s) by mouth once a day (at bedtime)  -- Indication: For Central nervous system agent    senna oral tablet  -- 2 tab(s) by mouth once a day (at bedtime), As needed, Constipation  -- Indication: For Gastrointestinal  agent    polyethylene glycol 3350 oral powder for reconstitution  -- 17 gram(s) by mouth once a day  -- Indication: For laxative    docusate sodium 100 mg oral capsule  -- 1 cap(s) by mouth 3 times a day  -- Indication: For stool softener    calcium-vitamin D 500 mg-200 intl units oral tablet  -- 1 tab(s) by mouth 2 times a day  -- Indication: For supplement    Multiple Vitamins oral tablet  -- 1 tab(s) by mouth once a day  -- Indication: For supplement

## 2018-03-01 NOTE — DISCHARGE NOTE ADULT - NS AS ACTIVITY OBS
Walking-Indoors allowed/Do not make important decisions/Sponge bath/Walking-Outdoors allowed/Stairs allowed/No Heavy lifting/straining

## 2018-03-01 NOTE — DISCHARGE NOTE ADULT - REASON FOR ADMISSION
left hip Fx  Open Reduction Internal Fixation / Surgical Repair / IM Nail L Hip R hip Fx  Open Reduction Internal Fixation / Surgical Repair / IM Nail L Hip Right hip Fracture  Open Reduction Internal Fixation / Surgical Repair / IM Nail Right Hip

## 2018-03-02 VITALS
TEMPERATURE: 99 F | OXYGEN SATURATION: 94 % | RESPIRATION RATE: 18 BRPM | HEART RATE: 73 BPM | DIASTOLIC BLOOD PRESSURE: 72 MMHG | SYSTOLIC BLOOD PRESSURE: 121 MMHG

## 2018-03-02 PROBLEM — F03.90 UNSPECIFIED DEMENTIA WITHOUT BEHAVIORAL DISTURBANCE: Chronic | Status: ACTIVE | Noted: 2018-02-25

## 2018-03-02 LAB
HCT VFR BLD CALC: 31 % — LOW (ref 34.5–45)
HGB BLD-MCNC: 10.3 G/DL — LOW (ref 11.5–15.5)
MCHC RBC-ENTMCNC: 31.9 PG — SIGNIFICANT CHANGE UP (ref 27–34)
MCHC RBC-ENTMCNC: 33.2 GM/DL — SIGNIFICANT CHANGE UP (ref 32–36)
MCV RBC AUTO: 96.1 FL — SIGNIFICANT CHANGE UP (ref 80–100)
PLATELET # BLD AUTO: 278 K/UL — SIGNIFICANT CHANGE UP (ref 150–400)
RBC # BLD: 3.22 M/UL — LOW (ref 3.8–5.2)
RBC # FLD: 12.6 % — SIGNIFICANT CHANGE UP (ref 10.3–14.5)
WBC # BLD: 10.3 K/UL — SIGNIFICANT CHANGE UP (ref 3.8–10.5)
WBC # FLD AUTO: 10.3 K/UL — SIGNIFICANT CHANGE UP (ref 3.8–10.5)

## 2018-03-02 PROCEDURE — C1769: CPT

## 2018-03-02 PROCEDURE — 99233 SBSQ HOSP IP/OBS HIGH 50: CPT

## 2018-03-02 PROCEDURE — 73501 X-RAY EXAM HIP UNI 1 VIEW: CPT

## 2018-03-02 PROCEDURE — 97530 THERAPEUTIC ACTIVITIES: CPT

## 2018-03-02 PROCEDURE — C1713: CPT

## 2018-03-02 PROCEDURE — 97161 PT EVAL LOW COMPLEX 20 MIN: CPT

## 2018-03-02 PROCEDURE — 99285 EMERGENCY DEPT VISIT HI MDM: CPT | Mod: 25

## 2018-03-02 PROCEDURE — 93005 ELECTROCARDIOGRAM TRACING: CPT

## 2018-03-02 PROCEDURE — 85610 PROTHROMBIN TIME: CPT

## 2018-03-02 PROCEDURE — 73502 X-RAY EXAM HIP UNI 2-3 VIEWS: CPT

## 2018-03-02 PROCEDURE — 97116 GAIT TRAINING THERAPY: CPT

## 2018-03-02 PROCEDURE — 96374 THER/PROPH/DIAG INJ IV PUSH: CPT | Mod: XU

## 2018-03-02 PROCEDURE — 72170 X-RAY EXAM OF PELVIS: CPT

## 2018-03-02 PROCEDURE — 85027 COMPLETE CBC AUTOMATED: CPT

## 2018-03-02 PROCEDURE — 36430 TRANSFUSION BLD/BLD COMPNT: CPT

## 2018-03-02 PROCEDURE — 86923 COMPATIBILITY TEST ELECTRIC: CPT

## 2018-03-02 PROCEDURE — 96376 TX/PRO/DX INJ SAME DRUG ADON: CPT | Mod: XU

## 2018-03-02 PROCEDURE — 87086 URINE CULTURE/COLONY COUNT: CPT

## 2018-03-02 PROCEDURE — 73552 X-RAY EXAM OF FEMUR 2/>: CPT

## 2018-03-02 PROCEDURE — 81003 URINALYSIS AUTO W/O SCOPE: CPT

## 2018-03-02 PROCEDURE — 80048 BASIC METABOLIC PNL TOTAL CA: CPT

## 2018-03-02 PROCEDURE — 85730 THROMBOPLASTIN TIME PARTIAL: CPT

## 2018-03-02 PROCEDURE — P9016: CPT

## 2018-03-02 PROCEDURE — 86850 RBC ANTIBODY SCREEN: CPT

## 2018-03-02 PROCEDURE — 86901 BLOOD TYPING SEROLOGIC RH(D): CPT

## 2018-03-02 PROCEDURE — 51702 INSERT TEMP BLADDER CATH: CPT

## 2018-03-02 PROCEDURE — 82962 GLUCOSE BLOOD TEST: CPT

## 2018-03-02 PROCEDURE — 71045 X-RAY EXAM CHEST 1 VIEW: CPT

## 2018-03-02 PROCEDURE — 76000 FLUOROSCOPY <1 HR PHYS/QHP: CPT

## 2018-03-02 PROCEDURE — 86900 BLOOD TYPING SEROLOGIC ABO: CPT

## 2018-03-02 PROCEDURE — 80053 COMPREHEN METABOLIC PANEL: CPT

## 2018-03-02 RX ADMIN — POLYETHYLENE GLYCOL 3350 17 GRAM(S): 17 POWDER, FOR SOLUTION ORAL at 11:32

## 2018-03-02 RX ADMIN — Medication 1 TABLET(S): at 05:46

## 2018-03-02 RX ADMIN — Medication 100 MILLIGRAM(S): at 05:46

## 2018-03-02 RX ADMIN — RIVAROXABAN 10 MILLIGRAM(S): KIT at 11:31

## 2018-03-02 RX ADMIN — Medication 1 TABLET(S): at 11:31

## 2018-03-02 RX ADMIN — PANTOPRAZOLE SODIUM 40 MILLIGRAM(S): 20 TABLET, DELAYED RELEASE ORAL at 11:31

## 2018-03-02 NOTE — PROGRESS NOTE ADULT - PROBLEM SELECTOR PLAN 4
- c/w Ca/Vit D supplement  - May benefit from outpatient f/u with PMD/endo for BMD testing
- Suspect Moderate Likely Alzheimer's type dementia w/o BPSD  - Very high risk for delirium  - Avoid sedatives/mood altering medications when possible  - Frequent re-orientation and assistance w/ ADLs  - Increased supervision for safety (patient disoriented at baseline with poor judgment  - Fall precautions, aspiration precautions  - If severely agitated and at risk of harm to self or others around her only then would try antipsychotic (Seroquel starting at dose of 12.5mg q6h PRN, and if unable to take PO would give Haldol 0.5mg q6h PRN IM/IV); otherwise would try behavioral interventions for distraction/re-direction, monitor QTc if using antipsychotic (Qtc on admission almost 500)  - c/w Donepezil for now.
- Suspect Moderate Likely Alzheimer's type dementia w/o BPSD  - Very high risk for delirium  - Would check STAT CT head w/o contrast to r/o ICH or other intracranial pathology if change in mental or neurologic status as unclear history of unwitnessed fall and patient is a limited historian   - Avoid sedatives/mood altering medications when possible  - Frequent re-orientation and needs assistance w/ ADLs  - Increased supervision for safety (patient disoriented at baseline with poor judgment  - Fall precautions, aspiration precautions  - If severely agitated and at risk of harm to self or others around her only then would try antipsychotic (Seroquel starting at dose of 12.5mg q6h PRN), otherwise would try behavioral interventions for distraction/re-direction, monitor QTc if using antipsychotic (Qtc on admission almost 500)  - c/w Donepezil for now.
- c/w Ca/Vit D supplementation  - check Vit D level  - Would benefit from eventual outpatient f/u with endocrine for further evaluation/treatment.

## 2018-03-02 NOTE — PROGRESS NOTE ADULT - SUBJECTIVE AND OBJECTIVE BOX
Patient is a 86y old  Female who presents with a chief complaint of Right hip Pain.     INTERVAL HPI: Patient seen and examined at bedside. No acute events overnight.  Patient denies having any complaints this morning including pain, although limited historian.      PRESENT SYMPTOMS:  Source: [x ] Patient   [ ] Family   [ ] Team     [ x] All other review of systems negative   [ ] Unable to obtain due to poor mentation       Allergies    No Known Allergies    Intolerances        MEDICATIONS  (STANDING):  acetaminophen  IVPB. 750 milliGRAM(s) IV Intermittent once  calcium carbonate 1250 mG + Vitamin D (OsCal 500 + D) 1 Tablet(s) Oral two times a day  docusate sodium 100 milliGRAM(s) Oral three times a day  donepezil 10 milliGRAM(s) Oral at bedtime  lactated ringers. 1000 milliLiter(s) (75 mL/Hr) IV Continuous <Continuous>  multivitamin 1 Tablet(s) Oral daily  pantoprazole    Tablet 40 milliGRAM(s) Oral daily  polyethylene glycol 3350 17 Gram(s) Oral daily  rivaroxaban 10 milliGRAM(s) Oral daily  simvastatin 20 milliGRAM(s) Oral at bedtime    MEDICATIONS  (PRN):  acetaminophen   Tablet 650 milliGRAM(s) Oral every 6 hours PRN For Temp greater than 38 C (100.4 F)  acetaminophen   Tablet. 975 milliGRAM(s) Oral every 8 hours PRN Mild Pain (1 - 3)  aluminum hydroxide/magnesium hydroxide/simethicone Suspension 30 milliLiter(s) Oral four times a day PRN Indigestion  bisacodyl Suppository 10 milliGRAM(s) Rectal daily PRN If no bowel movement by postoperative day #2  haloperidol     Tablet 2 milliGRAM(s) Oral every 6 hours PRN agitation  HYDROmorphone  Injectable 0.25 milliGRAM(s) IV Push every 6 hours PRN breakthrough  magnesium hydroxide Suspension 30 milliLiter(s) Oral daily PRN Constipation  ondansetron Injectable 4 milliGRAM(s) IV Push every 6 hours PRN Nausea and/or Vomiting  QUEtiapine 12.5 milliGRAM(s) Oral at bedtime PRN agitation  senna 2 Tablet(s) Oral at bedtime PRN Constipation  traMADol 25 milliGRAM(s) Oral every 4 hours PRN Moderate Pain (4 - 6)  traMADol 50 milliGRAM(s) Oral every 8 hours PRN Severe Pain (7 - 10)      PHYSICAL EXAM:  Vital Signs Last 24 Hrs  T(C): 37.2 (02 Mar 2018 08:55), Max: 37.6 (01 Mar 2018 17:21)  T(F): 99 (02 Mar 2018 08:55), Max: 99.7 (01 Mar 2018 17:21)  HR: 73 (02 Mar 2018 08:55) (69 - 80)  BP: 121/72 (02 Mar 2018 08:55) (105/75 - 127/75)  BP(mean): --  RR: 18 (02 Mar 2018 08:55) (18 - 18)  SpO2: 94% (02 Mar 2018 08:55) (94% - 98%)      LABORATORIES:                          10.3   10.3  )-----------( 278      ( 02 Mar 2018 07:01 )             31.0       03-01    139  |  103  |  10  ----------------------------<  102<H>  3.9   |  26  |  0.64    Ca    8.4      01 Mar 2018 06:50        CAPILLARY BLOOD GLUCOSE                    IMAGING:     Time spent counseling regarding:  [ ] Goals of care		[ ] Resuscitation status        [] Prognosis		[] Hospice     [] Discharge planning	   [] Symptom managemen  [ ] Emotional support	[ ] Bereavement                    [] Care coordination with other disciplines  Family meeting            Start time:		   End time:	         	Total Time:      Thank you for allowing me to take part in the care of this patient.  Please don't hesitate to call me anytime with questions/concerns.     Traci Paul MD  [facility]  Division of Geriatrics & Palliative Medicine  [address] [place] [address], [address]  [place], [place] [phone] (office)  [phone] (cell)  [email or web address] Patient is a 86y old  Female who presents with a chief complaint of Right hip Pain.     INTERVAL HPI: Patient seen and examined at bedside. No acute events overnight.  Patient denies having any complaints this morning including pain, although limited historian.      PRESENT SYMPTOMS:  Source: [x ] Patient   [ ] Family   [ ] Team     [ x] All other review of systems negative   [ ] Unable to obtain due to poor mentation       Allergies    No Known Allergies    Intolerances        MEDICATIONS  (STANDING):  acetaminophen  IVPB. 750 milliGRAM(s) IV Intermittent once  calcium carbonate 1250 mG + Vitamin D (OsCal 500 + D) 1 Tablet(s) Oral two times a day  docusate sodium 100 milliGRAM(s) Oral three times a day  donepezil 10 milliGRAM(s) Oral at bedtime  lactated ringers. 1000 milliLiter(s) (75 mL/Hr) IV Continuous <Continuous>  multivitamin 1 Tablet(s) Oral daily  pantoprazole    Tablet 40 milliGRAM(s) Oral daily  polyethylene glycol 3350 17 Gram(s) Oral daily  rivaroxaban 10 milliGRAM(s) Oral daily  simvastatin 20 milliGRAM(s) Oral at bedtime    MEDICATIONS  (PRN):  acetaminophen   Tablet 650 milliGRAM(s) Oral every 6 hours PRN For Temp greater than 38 C (100.4 F)  acetaminophen   Tablet. 975 milliGRAM(s) Oral every 8 hours PRN Mild Pain (1 - 3)  aluminum hydroxide/magnesium hydroxide/simethicone Suspension 30 milliLiter(s) Oral four times a day PRN Indigestion  bisacodyl Suppository 10 milliGRAM(s) Rectal daily PRN If no bowel movement by postoperative day #2  haloperidol     Tablet 2 milliGRAM(s) Oral every 6 hours PRN agitation  HYDROmorphone  Injectable 0.25 milliGRAM(s) IV Push every 6 hours PRN breakthrough  magnesium hydroxide Suspension 30 milliLiter(s) Oral daily PRN Constipation  ondansetron Injectable 4 milliGRAM(s) IV Push every 6 hours PRN Nausea and/or Vomiting  QUEtiapine 12.5 milliGRAM(s) Oral at bedtime PRN agitation  senna 2 Tablet(s) Oral at bedtime PRN Constipation  traMADol 25 milliGRAM(s) Oral every 4 hours PRN Moderate Pain (4 - 6)  traMADol 50 milliGRAM(s) Oral every 8 hours PRN Severe Pain (7 - 10)      PHYSICAL EXAM:  Vital Signs Last 24 Hrs  T(C): 37.2 (02 Mar 2018 08:55), Max: 37.6 (01 Mar 2018 17:21)  T(F): 99 (02 Mar 2018 08:55), Max: 99.7 (01 Mar 2018 17:21)  HR: 73 (02 Mar 2018 08:55) (69 - 80)  BP: 121/72 (02 Mar 2018 08:55) (105/75 - 127/75)  BP(mean): --  RR: 18 (02 Mar 2018 08:55) (18 - 18)  SpO2: 94% (02 Mar 2018 08:55) (94% - 98%)      GENERAL:  NAD, + fecal incontinence  - loose/watery/brown         [x ] Alert     [ ] lethargic   [ ] Agitated   [ ] Cachexia   HEENT: NCAT, FABIANA     [x ] Moist Oral Mucosa      [ ] Dry Oral Mucosa    [ ] ET Tube    [ ] Trach     NECK: Supple, no JVD  BREASTS: deferred  BACK: no spinal tenderness  [  ] Kyphosis   [  ] Scoliosis  RESPIRATORY: CTAB, no accessory muscle use      [ ] Tachypnea   [ ] Rhonchi  [ ] Crackles [ ] Wheezing   [ ] Audible excessive secretions   CARDIOVASCULAR: Regular S1S2                              [ ]  Murmur   [ ] Rub    [ ] Gallop  GI: +BS, soft, NT, ND, no guarding, no rebound tenderness         [ ] PEG  [ ] NGT   :  [ ] goyal  RECTAL: deferred  EXTREMITIES/MSK: able to move feet/toes b/l    VASCULAR:    No  Edema        [x ] b/l dp pulses palpaple    [ ] Clubbing      [ ] Cyanosis  NEURO: A&Ox1 to self only     [ ] focal weakness  [ ] facial asymmetry         [x ] Cognitive Impairment  SKIN: [ ] Rash      [ ] Ulcers   PSYCH: calm, cooperative, pleasant       LABORATORIES:                          10.3   10.3  )-----------( 278      ( 02 Mar 2018 07:01 )             31.0       03-01    139  |  103  |  10  ----------------------------<  102<H>  3.9   |  26  |  0.64    Ca    8.4      01 Mar 2018 06:50        CAPILLARY BLOOD GLUCOSE    IMAGING: no new imaging

## 2018-03-02 NOTE — PROGRESS NOTE ADULT - SUBJECTIVE AND OBJECTIVE BOX
ORTHO Progress Note 3/2/18    Subjective: 87 yo female who presents with a chief complaint of left hip pain (25 Feb 2018 17:29) who was found to have R IT fracture and is now seen on POD3 s/p IMN. Patient was resting comfortably in bed without complaints, she states her pain is well controlled. Patient denies chest pain, palpations, SOB, cough, wheezing nausea, vomiting, abnormal or calf pain. Patient has no acute events overnight and is in stable condition.     Vital Signs Last 24 Hrs  T(C): 37.4 (02 Mar 2018 04:32), Max: 37.6 (01 Mar 2018 17:21)  T(F): 99.3 (02 Mar 2018 04:32), Max: 99.7 (01 Mar 2018 17:21)  HR: 74 (02 Mar 2018 04:32) (69 - 80)  BP: 123/75 (02 Mar 2018 04:32) (105/75 - 127/75)  RR: 18 (02 Mar 2018 04:32) (18 - 18)  SpO2: 94% (02 Mar 2018 04:32) (94% - 98%)                          9.8    9.40  )-----------( 231      ( 01 Mar 2018 09:56 )             29.4     03-01    139  |  103  |  10  ----------------------------<  102<H>  3.9   |  26  |  0.64    Ca    8.4      01 Mar 2018 06:50    General: A&Ox3, Well nourished, Well developed, Resting Comfortably   Right LE: Wounds C/D/I, Staples left open to air, Venodyne compression boots on bilaterally    Neuro:              + Strong ankle DF/PF              2+ DP/TA              Grossly intact to light touch              Compartments soft, Calves soft and nontender

## 2018-03-02 NOTE — PROGRESS NOTE ADULT - PROBLEM SELECTOR PROBLEM 3
Gait instability
Osteoporosis with current pathological fracture, unspecified osteoporosis type, initial encounter
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
Gait instability

## 2018-03-02 NOTE — PROGRESS NOTE ADULT - PROVIDER SPECIALTY LIST ADULT
Cardiology
Geriatrics
Orthopedics
Cardiology
Orthopedics

## 2018-03-02 NOTE — PROGRESS NOTE ADULT - PROBLEM SELECTOR PLAN 6
Lenny Goldberg is surrogate decision maker and primary caretaker [phone]  - Pending discussion of ADs/MOLST when  present
-  is surrogate decision maker  - Pending discussion re: ADs
-  is surrogate decision maker  - Pending discussion re: ADs when  present.

## 2018-03-02 NOTE — PROGRESS NOTE ADULT - PROBLEM SELECTOR PROBLEM 4
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
Osteoporosis with current pathological fracture, unspecified osteoporosis type, initial encounter
Osteoporosis with current pathological fracture, unspecified osteoporosis type, initial encounter

## 2018-03-02 NOTE — PROGRESS NOTE ADULT - SUBJECTIVE AND OBJECTIVE BOX
Resting comfortably  No chest pain or dyspnea  /72  HR 73  Lungs clear  RR 1/6 systolic murmur  No edema    WBC 10.3  Hgb 10.3  Hct 31.0  Plt 278K    Imp: Stable from a CV standpoint for Rehab transfer

## 2018-03-02 NOTE — PROGRESS NOTE ADULT - ASSESSMENT
85 yo female who presents with a chief complaint of left hip pain (25 Feb 2018 17:29) who was found to have R IT fracture and is now seen on POD3 s/p IMN in stable condition.
Dementia Hypercholesterolemia and Hip fracture  POD #1 s/p  R Femur IMN  PT and mobilization per Ortho  Stable from CV standpoint
#Dementia  #HL  #Hip fx: remains in stable and optimal condition for necessary repair fractured hip.
87 y/o Woman w/ PMHx of Dementia, HLD, who presented w/ R hip pain after found on cuauhtemoc floor by  - admitted w/ R hip Fx and now s/p IMN of R hip.
87 y/o Woman w/ PMHx of Dementia, HLD, who presented w/ R hip pain after found on cuauhtemoc floor by  - admitted w/ R hip Fx and now s/p IMN of R hip.
Assessment:  s/p R hip Fx    Plan:   NPO  for OR  ck labs  Med note appreciated
Dementia Hypercholesterolemia and Hip fracture  POD #1 s/p  R Femur IMN  PT and mobilization per Ortho  Stable from CV standpoint
Impression: Stable       Plan:   Continue present treatment                 Out of bed, ambulate, weight bearing as tolerated                  Physical therapy follow up                  Continue to monitor    Jorge Liriano PA-C  Orthopaedic Surgery  Team pager [phone]  [phone]
85 y/o Woman w/ PMHx of Dementia, HLD, who presented w/ R hip pain after found on cuauhtemoc floor by  - admitted w/ R hip Fx and now s/p IMN of R hip.
85 y/o Woman w/ PMHx of Dementia, HLD, who presented w/ R hip pain after found on the floor by  - admitted w/ R hip Fx and now s/p IMN of R hip.

## 2018-03-02 NOTE — PROGRESS NOTE ADULT - PROBLEM SELECTOR PLAN 5
- Suspect Moderate Likely Alzheimer's type dementia w/o BPSD  - Very high risk for delirium  - Avoid sedatives/mood altering medications when possible  - Frequent re-orientation and assistance w/ ADLs  - Increased supervision for safety (patient disoriented at baseline with poor judgment  - Fall precautions, aspiration precautions  - If severely agitated and at risk of harm to self or others around her only then would try antipsychotic (Seroquel starting at dose of 12.5mg q6h PRN, and if unable to take PO would give Haldol 0.5mg q6h PRN IM/IV); otherwise would try behavioral interventions for distraction/re-direction, monitor QTc if using antipsychotic (Qtc on admission almost 500)  - c/w Donepezil for now.   - Family looking into additional help for  when patient returns home as difficulty managing as primary caregiver

## 2018-03-02 NOTE — PROGRESS NOTE ADULT - PROBLEM SELECTOR PROBLEM 1
Closed fracture of right hip, initial encounter

## 2018-03-02 NOTE — PROGRESS NOTE ADULT - ATTENDING COMMENTS
Thank you for allowing me to take part in the care of this patient.  Please don't hesitate to call me anytime with questions/concerns.     Traci Paul MD  [facility]  Division of Geriatrics & Palliative Medicine  [address]  [place], [place] [id number] [phone] (office)  [phone] (cell)  [email or web address]
Thank you for allowing me to take part in the care of this patient.  Please don't hesitate to call me anytime with questions/concerns.     Traci Paul MD  [facility]  Division of Geriatrics & Palliative Medicine  [address], [address] [facility] [address]  [place], [place] [id number] [phone] (office)  [phone] (cell)  [email or web address]
Thank you for allowing me to take part in the care of this patient.  Please don't hesitate to call me anytime with questions/concerns.     Traci Paul MD  [facility]  Division of Geriatrics & Palliative Medicine  [address], [facility] [address]  [place], [place] [id number] [phone] (office)  [phone] (cell)  [email or web address]
Traci Paul MD  [facility]  Division of Geriatrics & Palliative Medicine  [address], [address]  [place] [address]  [phone] (office)  [phone] (cell)  [email or web address]

## 2018-03-02 NOTE — PROGRESS NOTE ADULT - PROBLEM SELECTOR PROBLEM 5
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
Advance care planning
Gait instability
Gait instability

## 2018-03-02 NOTE — PROGRESS NOTE ADULT - PROBLEM SELECTOR PLAN 2
- mild pain at present  - Would consider simplifying pain med regimen to Tylenol 650mg q6h standing; oxycodone 2.5mg q4h PRN moderate pain, Oxycodone 5mg q4h PRN severe pain; and adjust dose slowly as needed; would benefit from lowest dose necessary for comfort to avoid delirium  - would c/w bowel regimen while on opioids - colace 100mg TID, Senna 2 tabs qhs, miralax daily PRN for constipation >2 days, can also give Dulcolax supp daily PRN for constipation >3 days
- pain controlled at rest, exacerbated w/ movement  - Would consider simplifying pain med regimen to Tylenol 650mg q6h standing; oxycodone 2.5mg q4h PRN moderate pain, Oxycodone 5mg q4h PRN severe pain; and adjust dose slowly as needed; would benefit from lowest dose necessary for comfort to avoid delirium  - If possible would schedule to give pain med (lowest needed dose) half hour prior to PT   - would c/w bowel regimen while on opioids - colace 100mg TID, Senna 2 tabs qhs standing, miralax daily PRN for constipation >2 days, can also give Dulcolax supp daily PRN for constipation >3 days.
- mild pain at present  - Would consider simplifying pain med regimen to Tylenol 650mg q6h standing; oxycodone 2.5mg q4h PRN moderate pain, Oxycodone 5mg q4h PRN severe pain; and adjust dose slowly as needed; would benefit from lowest dose necessary for comfort to avoid delirium  - would c/w bowel regimen while on opioids - colace 100mg TID, Senna 2 tabs qhs, miralax daily PRN for constipation >2 days, can also give Dulcolax supp daily PRN for constipation >3 days.
- pain controlled at rest, exacerbated w/ movement  - Would consider simplifying pain med regimen to Tylenol 650mg q6h standing; oxycodone 2.5mg q4h PRN moderate pain, Oxycodone 5mg q4h PRN severe pain; and adjust dose slowly as needed; would benefit from lowest dose necessary for comfort to avoid delirium  - If possible would schedule to give pain med (lowest needed dose) half hour prior to PT   - would c/w bowel regimen while on opioids - colace 100mg TID, Senna 2 tabs qhs standing, miralax daily PRN for constipation >2 days, can also give Dulcolax supp daily PRN for constipation >3 days.

## 2018-03-02 NOTE — PROGRESS NOTE ADULT - PROBLEM SELECTOR PLAN 1
- s/p IMN R hip 2/27 w/o immediate complications   - c/w mgmt per primary/ortho team.   - Pain control, Planned for DANO today - Ramirez

## 2018-03-02 NOTE — PROGRESS NOTE ADULT - PROBLEM SELECTOR PLAN 1
Continue current treatment, Continue to monitor vitals, Continue to monitor wound  WBAT, Physical therapy follow up  DVT ppx (Xarelto 10 mg, Orally, Once a day. Venodyne compression boots. Encourage OOB ambulation.)  Pain management (Acetaminophen 975 mg, Orally, Q8 PRN for mild pain. Tramadol 25 mg, Orally, Q8 PRN for moderate pain. Tramadol 50 mg, Orally, Q8 PRN for severe pain. Low dose narcotics)  Dispo planning to rehab    Salma RENDON Student  Orthopedic Pager [phone] Continue current treatment, Continue to monitor vitals, Continue to monitor wound  WBAT, Physical therapy follow up  DVT ppx (Xarelto 10 mg, Orally, Once a day. Venodyne compression boots. Encourage OOB ambulation.)  Pain management (Acetaminophen 975 mg, Orally, Q8 PRN for mild pain. Tramadol 25 mg, Orally, Q8 PRN for moderate pain. Tramadol 50 mg, Orally, Q8 PRN for severe pain. Low dose narcotics)  Dispo planning to rehab    Salma RENDON Student  Orthopedic Pager [phone]        I have reviewed the student's note and agree with the above information.    HEAVEN Arora PA-C  #[phone] Continue current treatment, Continue to monitor vitals, Continue to monitor wound  WBAT, Physical therapy follow up  DVT ppx (Xarelto 10 mg, Orally, Once a day. Venodyne compression boots. Encourage OOB ambulation.)  Pain management (Acetaminophen 975 mg, Orally, Q8 PRN for mild pain. Tramadol 25 mg, Orally, Q8 PRN for moderate pain. Tramadol 50 mg, Orally, Q8 PRN for severe pain. Low dose narcotics)  Dispo planning to rehab    Salma RENDON Student  Orthopedic Pager [phone]        I have reviewed the student's note, examined the patient and agree with the above information.    HEAVEN Arora PA-C  #[phone]

## 2020-08-25 NOTE — PHYSICAL THERAPY INITIAL EVALUATION ADULT - PRECAUTIONS/LIMITATIONS, REHAB EVAL
Medical Necessity Clause: This procedure was medically necessary because the lesions that were treated were: Post-Care Instructions: I reviewed with the patient in detail post-care instructions. Patient is to wear sunprotection, and avoid picking at any of the treated lesions. Pt may apply Vaseline to crusted or scabbing areas. Medical Necessity Information: It is in your best interest to select a reason for this procedure from the list below. All of these items fulfill various CMS LCD requirements except the new and changing color options. Number Of Freeze-Thaw Cycles: 2 freeze-thaw cycles Pared With?: 15 blade Detail Level: Detailed Render Post-Care Instructions In Note?: no Consent: The patient's consent was obtained including but not limited to risks of crusting, scabbing, blistering, scarring, darker or lighter pigmentary change, recurrence, incomplete removal and infection. fall precautions

## 2020-10-18 NOTE — PHYSICAL THERAPY INITIAL EVALUATION ADULT - ADDITIONAL COMMENTS
as per [facility] notes pt has 4 steps to enter. community ambulator with AD.  lives with her .  dementia a/ox2 at baseline. (2) well flexed

## 2022-06-14 NOTE — ED PROVIDER NOTE - RESPIRATORY NEGATIVE STATEMENT, MLM
normal bowel sounds , no masses palpable , no guarding or rigidity , soft, nontender, nondistended no chest pain, no cough, and no shortness of breath.